# Patient Record
Sex: FEMALE | Race: WHITE | NOT HISPANIC OR LATINO | Employment: UNEMPLOYED | URBAN - METROPOLITAN AREA
[De-identification: names, ages, dates, MRNs, and addresses within clinical notes are randomized per-mention and may not be internally consistent; named-entity substitution may affect disease eponyms.]

---

## 2018-05-01 ENCOUNTER — HISTORICAL (OUTPATIENT)
Dept: ENDOSCOPY | Facility: HOSPITAL | Age: 48
End: 2018-05-01

## 2018-11-07 ENCOUNTER — HISTORICAL (OUTPATIENT)
Dept: ADMINISTRATIVE | Facility: HOSPITAL | Age: 48
End: 2018-11-07

## 2019-12-11 ENCOUNTER — HISTORICAL (OUTPATIENT)
Dept: ADMINISTRATIVE | Facility: HOSPITAL | Age: 49
End: 2019-12-11

## 2020-09-15 ENCOUNTER — HISTORICAL (OUTPATIENT)
Dept: ADMINISTRATIVE | Facility: HOSPITAL | Age: 50
End: 2020-09-15

## 2020-09-15 LAB
ABS NEUT (OLG): 4.77 X10(3)/MCL (ref 2.1–9.2)
ALBUMIN SERPL-MCNC: 3.6 GM/DL (ref 3.4–5)
ALBUMIN/GLOB SERPL: 0.9 RATIO (ref 1.1–2)
ALP SERPL-CCNC: 83 UNIT/L (ref 45–117)
ALT SERPL-CCNC: 43 UNIT/L (ref 12–78)
APPEARANCE, UA: ABNORMAL
AST SERPL-CCNC: 26 UNIT/L (ref 15–37)
BACTERIA #/AREA URNS AUTO: ABNORMAL /HPF
BASOPHILS # BLD AUTO: 0 X10(3)/MCL (ref 0–0.2)
BASOPHILS NFR BLD AUTO: 0 %
BILIRUB SERPL-MCNC: 0.8 MG/DL (ref 0.2–1)
BILIRUB UR QL STRIP: NEGATIVE
BILIRUBIN DIRECT+TOT PNL SERPL-MCNC: 0.2 MG/DL (ref 0–0.2)
BILIRUBIN DIRECT+TOT PNL SERPL-MCNC: 0.6 MG/DL
BUN SERPL-MCNC: 14 MG/DL (ref 7–18)
C3 SERPL-MCNC: 178 MG/DL (ref 80–173)
C4 SERPL-MCNC: 33.7 MG/DL (ref 13–46)
CALCIUM SERPL-MCNC: 9.2 MG/DL (ref 8.5–10.1)
CHLORIDE SERPL-SCNC: 105 MMOL/L (ref 98–107)
CO2 SERPL-SCNC: 29 MMOL/L (ref 21–32)
COLOR UR: YELLOW
CREAT SERPL-MCNC: 0.9 MG/DL (ref 0.6–1.3)
CREAT UR-MCNC: 163 MG/DL
CRP SERPL-MCNC: 1.5 MG/DL
EOSINOPHIL # BLD AUTO: 0.2 X10(3)/MCL (ref 0–0.9)
EOSINOPHIL NFR BLD AUTO: 2 %
ERYTHROCYTE [DISTWIDTH] IN BLOOD BY AUTOMATED COUNT: 13.6 % (ref 11.5–14.5)
ERYTHROCYTE [SEDIMENTATION RATE] IN BLOOD: 25 MM/HR (ref 0–20)
GLOBULIN SER-MCNC: 4.1 GM/ML (ref 2.3–3.5)
GLUCOSE (UA): 300 MG/DL
GLUCOSE SERPL-MCNC: 124 MG/DL (ref 74–106)
HCT VFR BLD AUTO: 37.5 % (ref 35–46)
HGB BLD-MCNC: 11.9 GM/DL (ref 12–16)
HGB UR QL STRIP: NEGATIVE
HYALINE CASTS #/AREA URNS LPF: ABNORMAL /LPF
IMM GRANULOCYTES # BLD AUTO: 0.02 10*3/UL
IMM GRANULOCYTES NFR BLD AUTO: 0 %
KETONES UR QL STRIP: NEGATIVE
LEUKOCYTE ESTERASE UR QL STRIP: NEGATIVE
LYMPHOCYTES # BLD AUTO: 2.5 X10(3)/MCL (ref 0.6–4.6)
LYMPHOCYTES NFR BLD AUTO: 30 %
MCH RBC QN AUTO: 27.8 PG (ref 26–34)
MCHC RBC AUTO-ENTMCNC: 31.7 GM/DL (ref 31–37)
MCV RBC AUTO: 87.6 FL (ref 80–100)
MONOCYTES # BLD AUTO: 0.6 X10(3)/MCL (ref 0.1–1.3)
MONOCYTES NFR BLD AUTO: 7 %
NEUTROPHILS # BLD AUTO: 4.77 X10(3)/MCL (ref 2.1–9.2)
NEUTROPHILS NFR BLD AUTO: 59 %
NITRITE UR QL STRIP: NEGATIVE
PH UR STRIP: 5.5 [PH] (ref 4.5–8)
PLATELET # BLD AUTO: 214 X10(3)/MCL (ref 130–400)
PMV BLD AUTO: 11 FL (ref 7.4–10.4)
POTASSIUM SERPL-SCNC: 4.2 MMOL/L (ref 3.5–5.1)
PROT SERPL-MCNC: 7.7 GM/DL (ref 6.4–8.2)
PROT UR QL STRIP: NEGATIVE
PROT UR STRIP-MCNC: 13.3 MG/DL
PROT/CREAT UR-RTO: 81.6 MG/GM
RBC # BLD AUTO: 4.28 X10(6)/MCL (ref 4–5.2)
RBC #/AREA URNS AUTO: ABNORMAL /HPF
SODIUM SERPL-SCNC: 139 MMOL/L (ref 136–145)
SP GR UR STRIP: 1.02 (ref 1–1.03)
SQUAMOUS #/AREA URNS LPF: >100 /LPF
URATE SERPL-MCNC: 5.3 MG/DL (ref 2.6–6)
UROBILINOGEN UR STRIP-ACNC: NORMAL
WBC # SPEC AUTO: 8.1 X10(3)/MCL (ref 4.5–11)
WBC #/AREA URNS AUTO: ABNORMAL /HPF

## 2020-09-17 LAB — FINAL CULTURE: NO GROWTH

## 2020-09-22 ENCOUNTER — HISTORICAL (OUTPATIENT)
Dept: ADMINISTRATIVE | Facility: HOSPITAL | Age: 50
End: 2020-09-22

## 2020-11-24 ENCOUNTER — HISTORICAL (OUTPATIENT)
Dept: ANESTHESIOLOGY | Facility: HOSPITAL | Age: 50
End: 2020-11-24

## 2020-11-24 LAB — CRC RECOMMENDATION EXT: NORMAL

## 2020-12-29 ENCOUNTER — HISTORICAL (OUTPATIENT)
Dept: ADMINISTRATIVE | Facility: HOSPITAL | Age: 50
End: 2020-12-29

## 2021-03-04 LAB
CHOLEST SERPL-MCNC: 166 MG/DL (ref 0–200)
DEPRECATED CALCIDIOL+CALCIFEROL SERPL-MC: 45 NG/ML (ref 30–100)
EST. AVERAGE GLUCOSE BLD GHB EST-MCNC: 144 MG/DL (ref 70–115)
HBA1C MFR BLD: 6.8 % (ref 4–6)
HDLC SERPL-MCNC: 49 MG/DL (ref 40–60)
LDLC SERPL CALC-MCNC: 108.1 MG/DL (ref 30–100)
TRIGL SERPL-MCNC: 107 MG/DL (ref 30–200)
TSH SERPL-ACNC: 2.52 UIU/ML (ref 0.36–3.74)

## 2021-05-27 LAB
ALBUMIN SERPL-MCNC: 3.6 G/DL (ref 3.4–5)
ALBUMIN/GLOB SERPL: 1.3 {RATIO}
ALP SERPL-CCNC: 82 U/L (ref 50–144)
ALT SERPL-CCNC: 24 U/L (ref 1–45)
ANION GAP SERPL CALC-SCNC: 7 MMOL/L (ref 7–16)
AST SERPL-CCNC: 24 U/L (ref 14–36)
BASOPHILS # BLD AUTO: 0.02 X10(3)/MCL (ref 0.01–0.08)
BASOPHILS NFR BLD AUTO: 0.3 % (ref 0.1–1.2)
BILIRUB SERPL-MCNC: 0.92 MG/DL (ref 0.1–1)
BUN SERPL-MCNC: 12 MG/DL (ref 7–20)
CALCIUM SERPL-MCNC: 9 MG/DL (ref 8.4–10.2)
CHLORIDE SERPL-SCNC: 102 MMOL/L (ref 94–110)
CHOLEST SERPL-MCNC: 140 MG/DL (ref 0–200)
CO2 SERPL-SCNC: 30 MMOL/L (ref 21–32)
CREAT SERPL-MCNC: 0.8 MG/DL (ref 0.52–1.04)
CREAT/UREA NIT SERPL: 15 (ref 12–20)
EOSINOPHIL # BLD AUTO: 0.21 X10(3)/MCL (ref 0.04–0.36)
EOSINOPHIL NFR BLD AUTO: 2.9 % (ref 0.7–7)
ERYTHROCYTE [DISTWIDTH] IN BLOOD BY AUTOMATED COUNT: 14.5 % (ref 11–14.5)
GLOBULIN SER-MCNC: 2.8 G/DL (ref 2–3.9)
GLUCOSE SERPL-MCNC: 157 MGM./DL (ref 70–115)
HCT VFR BLD AUTO: 37.6 % (ref 36–48)
HDLC SERPL-MCNC: 46 MG/DL (ref 40–60)
HGB BLD-MCNC: 11.7 G/DL (ref 11.8–16)
IMM GRANULOCYTES # BLD AUTO: 0.01 X10E3/UL (ref 0–0.03)
IMM GRANULOCYTES NFR BLD AUTO: 0.1 % (ref 0–0.5)
LDLC SERPL CALC-MCNC: 76.6 MG/DL (ref 30–100)
LYMPHOCYTES # BLD AUTO: 2.18 X10(3)/MCL (ref 1.16–3.74)
LYMPHOCYTES NFR BLD AUTO: 30 % (ref 20–55)
MCH RBC QN AUTO: 26.8 PG (ref 27–34)
MCHC RBC AUTO-ENTMCNC: 31.1 G/DL (ref 31–37)
MCV RBC AUTO: 86.2 FL (ref 79–99)
MONOCYTES # BLD AUTO: 0.46 X10(3)/MCL (ref 0.24–0.36)
MONOCYTES NFR BLD AUTO: 6.3 % (ref 4.7–12.5)
NEUTROPHILS # BLD AUTO: 4.38 X10(3)/MCL (ref 1.56–6.13)
NEUTROPHILS NFR BLD AUTO: 60.4 % (ref 37–73)
PLATELET # BLD AUTO: 212 X10(3)/MCL (ref 140–371)
PMV BLD AUTO: 12.1 FL (ref 9.4–12.4)
POTASSIUM SERPL-SCNC: 4.5 MMOL/L (ref 3.5–5.1)
PROT SERPL-MCNC: 6.4 G/DL (ref 6.3–8.2)
RBC # BLD AUTO: 4.36 X10(6)/MCL (ref 4–5.1)
SODIUM SERPL-SCNC: 139 MMOL/L (ref 135–145)
TRIGL SERPL-MCNC: 99 MG/DL (ref 30–200)
WBC # SPEC AUTO: 7.3 X10(3)/MCL (ref 4–11.5)

## 2021-06-03 LAB
EST. AVERAGE GLUCOSE BLD GHB EST-MCNC: 159 MG/DL (ref 70–115)
HBA1C MFR BLD: 7.3 % (ref 4–6)

## 2021-07-22 DIAGNOSIS — G56.03 BILATERAL CARPAL TUNNEL SYNDROME: Primary | ICD-10-CM

## 2021-07-30 DIAGNOSIS — G56.03 CARPAL TUNNEL SYNDROME, BILATERAL: Primary | ICD-10-CM

## 2021-08-19 ENCOUNTER — OFFICE VISIT (OUTPATIENT)
Dept: ORTHOPEDICS | Facility: CLINIC | Age: 51
End: 2021-08-19
Payer: MEDICAID

## 2021-08-19 DIAGNOSIS — G56.03 CARPAL TUNNEL SYNDROME, BILATERAL: Primary | ICD-10-CM

## 2021-08-19 DIAGNOSIS — M77.11 RIGHT LATERAL EPICONDYLITIS: ICD-10-CM

## 2021-08-19 PROCEDURE — 99203 OFFICE O/P NEW LOW 30 MIN: CPT | Mod: 25,S$GLB,, | Performed by: ORTHOPAEDIC SURGERY

## 2021-08-19 PROCEDURE — 20551 NJX 1 TENDON ORIGIN/INSJ: CPT | Mod: 51,RT,S$GLB, | Performed by: ORTHOPAEDIC SURGERY

## 2021-08-19 PROCEDURE — 20526 THER INJECTION CARP TUNNEL: CPT | Mod: 59,50,S$GLB, | Performed by: ORTHOPAEDIC SURGERY

## 2021-08-19 PROCEDURE — 99203 PR OFFICE/OUTPT VISIT, NEW, LEVL III, 30-44 MIN: ICD-10-PCS | Mod: 25,S$GLB,, | Performed by: ORTHOPAEDIC SURGERY

## 2021-08-19 PROCEDURE — 20526 CARPAL TUNNEL: ICD-10-PCS | Mod: 59,50,S$GLB, | Performed by: ORTHOPAEDIC SURGERY

## 2021-08-19 PROCEDURE — 20551 TENDON ORIGIN: R ELBOW: ICD-10-PCS | Mod: 51,RT,S$GLB, | Performed by: ORTHOPAEDIC SURGERY

## 2021-08-19 RX ORDER — LOSARTAN POTASSIUM 50 MG/1
TABLET ORAL
COMMUNITY
Start: 2021-08-02 | End: 2023-04-18 | Stop reason: DRUGHIGH

## 2021-08-19 RX ORDER — VERAPAMIL HYDROCHLORIDE 360 MG/1
360 CAPSULE, DELAYED RELEASE PELLETS ORAL DAILY
COMMUNITY
Start: 2021-08-02

## 2021-08-19 RX ORDER — PEN NEEDLE, DIABETIC 31 GX5/16"
NEEDLE, DISPOSABLE MISCELLANEOUS
COMMUNITY
Start: 2021-06-03

## 2021-08-19 RX ORDER — CHOLECALCIFEROL (VITAMIN D3) 125 MCG
5000 CAPSULE ORAL DAILY
COMMUNITY
Start: 2021-08-02 | End: 2021-12-16 | Stop reason: SDUPTHER

## 2021-08-19 RX ORDER — ALBUTEROL SULFATE 0.83 MG/ML
SOLUTION RESPIRATORY (INHALATION)
COMMUNITY
Start: 2021-07-19

## 2021-08-19 RX ORDER — DULOXETIN HYDROCHLORIDE 60 MG/1
60 CAPSULE, DELAYED RELEASE ORAL DAILY
COMMUNITY
Start: 2021-08-12 | End: 2023-03-01

## 2021-08-19 RX ORDER — ALBUTEROL SULFATE 90 UG/1
AEROSOL, METERED RESPIRATORY (INHALATION)
COMMUNITY
Start: 2021-07-19 | End: 2023-08-09 | Stop reason: SDUPTHER

## 2021-08-19 RX ORDER — ATORVASTATIN CALCIUM 80 MG/1
TABLET, FILM COATED ORAL
COMMUNITY
Start: 2021-08-12 | End: 2023-03-01

## 2021-08-19 RX ORDER — GABAPENTIN 400 MG/1
400 CAPSULE ORAL NIGHTLY
COMMUNITY
Start: 2021-08-12 | End: 2023-08-09 | Stop reason: SDUPTHER

## 2021-08-19 RX ORDER — DICYCLOMINE HYDROCHLORIDE 10 MG/1
10 CAPSULE ORAL
COMMUNITY
Start: 2020-08-21 | End: 2023-06-14

## 2021-08-19 RX ORDER — CETIRIZINE HYDROCHLORIDE 10 MG/1
TABLET, FILM COATED ORAL
COMMUNITY
Start: 2021-08-12 | End: 2021-12-16 | Stop reason: SDUPTHER

## 2021-08-19 RX ORDER — CALCIUM CITRATE/VITAMIN D3 200MG-6.25
TABLET ORAL
COMMUNITY
Start: 2021-08-12 | End: 2023-08-09 | Stop reason: SDUPTHER

## 2021-08-19 RX ORDER — TRIAMCINOLONE ACETONIDE 1 MG/G
1 OINTMENT TOPICAL DAILY PRN
COMMUNITY
Start: 2020-11-12

## 2021-08-19 RX ORDER — ACETAMINOPHEN 500 MG
5000 TABLET ORAL
COMMUNITY
Start: 2020-08-21 | End: 2021-12-16 | Stop reason: SDUPTHER

## 2021-08-19 RX ORDER — TIZANIDINE 4 MG/1
TABLET ORAL
COMMUNITY
Start: 2021-08-12 | End: 2023-03-23 | Stop reason: SDUPTHER

## 2021-08-19 RX ORDER — INSULIN PUMP SYRINGE, 3 ML
EACH MISCELLANEOUS
COMMUNITY
Start: 2021-01-05

## 2021-08-19 RX ORDER — RANOLAZINE 500 MG/1
500 TABLET, EXTENDED RELEASE ORAL 2 TIMES DAILY
COMMUNITY
Start: 2021-08-12

## 2021-08-19 RX ORDER — FOLIC ACID 1 MG/1
1000 TABLET ORAL DAILY
COMMUNITY
Start: 2021-08-12 | End: 2023-03-01

## 2021-08-19 RX ORDER — CETIRIZINE HYDROCHLORIDE 10 MG/1
10 TABLET ORAL
COMMUNITY
Start: 2020-08-21 | End: 2023-03-23 | Stop reason: SDUPTHER

## 2021-08-19 RX ORDER — DEXAMETHASONE 4 MG/1
1 TABLET ORAL EVERY 12 HOURS
COMMUNITY
Start: 2020-08-21 | End: 2023-08-09 | Stop reason: SDUPTHER

## 2021-08-19 RX ORDER — DOXYCYCLINE 100 MG/1
100 CAPSULE ORAL 2 TIMES DAILY
COMMUNITY
Start: 2021-04-26 | End: 2021-12-16 | Stop reason: SDUPTHER

## 2021-08-19 RX ORDER — METFORMIN HYDROCHLORIDE 500 MG/1
1000 TABLET ORAL 2 TIMES DAILY
COMMUNITY
Start: 2021-08-12 | End: 2023-05-16 | Stop reason: SDUPTHER

## 2021-08-19 RX ORDER — MONTELUKAST SODIUM 10 MG/1
TABLET ORAL
COMMUNITY
Start: 2021-08-02 | End: 2023-03-23 | Stop reason: SDUPTHER

## 2021-09-07 LAB
ALBUMIN SERPL-MCNC: 3.8 G/DL (ref 3.4–5)
ALBUMIN/GLOB SERPL: 1.3 {RATIO}
ALP SERPL-CCNC: 80 U/L (ref 50–144)
ALT SERPL-CCNC: 26 U/L (ref 1–45)
ANION GAP SERPL CALC-SCNC: 6 MMOL/L (ref 7–16)
AST SERPL-CCNC: 22 U/L (ref 14–36)
BILIRUB SERPL-MCNC: 0.89 MG/DL (ref 0.1–1)
BUN SERPL-MCNC: 18 MG/DL (ref 7–20)
CALCIUM SERPL-MCNC: 9.3 MG/DL (ref 8.4–10.2)
CHLORIDE SERPL-SCNC: 100 MMOL/L (ref 94–110)
CO2 SERPL-SCNC: 33 MMOL/L (ref 21–32)
CREAT SERPL-MCNC: 0.8 MG/DL (ref 0.52–1.04)
CREAT/UREA NIT SERPL: 22.5 (ref 12–20)
EST. AVERAGE GLUCOSE BLD GHB EST-MCNC: 147 MG/DL (ref 70–115)
GLOBULIN SER-MCNC: 2.9 G/DL (ref 2–3.9)
GLUCOSE SERPL-MCNC: 113 MGM./DL (ref 70–115)
HBA1C MFR BLD: 6.9 % (ref 4–6)
POTASSIUM SERPL-SCNC: 4.5 MMOL/L (ref 3.5–5.1)
PROT SERPL-MCNC: 6.7 G/DL (ref 6.3–8.2)
SODIUM SERPL-SCNC: 139 MMOL/L (ref 135–145)

## 2021-10-18 ENCOUNTER — HISTORICAL (OUTPATIENT)
Dept: ADMINISTRATIVE | Facility: HOSPITAL | Age: 51
End: 2021-10-18

## 2021-10-21 LAB
LEFT EYE DM RETINOPATHY: NEGATIVE
RIGHT EYE DM RETINOPATHY: NEGATIVE

## 2021-11-19 ENCOUNTER — HISTORICAL (OUTPATIENT)
Dept: ADMINISTRATIVE | Facility: HOSPITAL | Age: 51
End: 2021-11-19

## 2021-12-16 ENCOUNTER — OFFICE VISIT (OUTPATIENT)
Dept: ORTHOPEDICS | Facility: CLINIC | Age: 51
End: 2021-12-16
Payer: MEDICAID

## 2021-12-16 DIAGNOSIS — G56.03 CARPAL TUNNEL SYNDROME, BILATERAL: Primary | ICD-10-CM

## 2021-12-16 LAB
ANION GAP SERPL CALC-SCNC: 7 MMOL/L (ref 3–11)
BASOPHILS NFR BLD: 0.3 % (ref 0–3)
BUN SERPL-MCNC: 13 MG/DL (ref 7–18)
BUN/CREAT SERPL: 15.66 RATIO (ref 7–18)
CALCIUM SERPL-MCNC: 8.8 MG/DL (ref 8.8–10.5)
CHLORIDE SERPL-SCNC: 104 MMOL/L (ref 100–108)
CO2 SERPL-SCNC: 29 MMOL/L (ref 21–32)
CREAT SERPL-MCNC: 0.83 MG/DL (ref 0.55–1.02)
EOSINOPHIL NFR BLD: 2.2 % (ref 1–3)
ERYTHROCYTE [DISTWIDTH] IN BLOOD BY AUTOMATED COUNT: 14 % (ref 12.5–18)
GFR ESTIMATION: > 60
GLUCOSE SERPL-MCNC: 156 MG/DL (ref 70–110)
HCT VFR BLD AUTO: 37 % (ref 37–47)
HGB BLD-MCNC: 11.5 G/DL (ref 12–16)
LYMPHOCYTES NFR BLD: 30.5 % (ref 25–40)
MCH RBC QN AUTO: 27.5 PG (ref 27–31.2)
MCHC RBC AUTO-ENTMCNC: 31.1 G/DL (ref 31.8–35.4)
MCV RBC AUTO: 88.5 FL (ref 80–97)
MONOCYTES NFR BLD: 7.2 % (ref 1–15)
NEUTROPHILS # BLD AUTO: 4.28 10*3/UL (ref 1.8–7.7)
NEUTROPHILS NFR BLD: 59.5 % (ref 37–80)
NUCLEATED RED BLOOD CELLS: 0 %
PLATELETS: 213 10*3/UL (ref 142–424)
POTASSIUM SERPL-SCNC: 4.2 MMOL/L (ref 3.6–5.2)
RBC # BLD AUTO: 4.18 10*6/UL (ref 4.2–5.4)
SODIUM BLD-SCNC: 140 MMOL/L (ref 135–145)
WBC # BLD: 7.2 10*3/UL (ref 4.6–10.2)

## 2021-12-16 PROCEDURE — 4010F PR ACE/ARB THEARPY RXD/TAKEN: ICD-10-PCS | Mod: CPTII,S$GLB,, | Performed by: ORTHOPAEDIC SURGERY

## 2021-12-16 PROCEDURE — 4010F ACE/ARB THERAPY RXD/TAKEN: CPT | Mod: CPTII,S$GLB,, | Performed by: ORTHOPAEDIC SURGERY

## 2021-12-16 PROCEDURE — 99213 PR OFFICE/OUTPT VISIT, EST, LEVL III, 20-29 MIN: ICD-10-PCS | Mod: S$GLB,,, | Performed by: ORTHOPAEDIC SURGERY

## 2021-12-16 PROCEDURE — 99213 OFFICE O/P EST LOW 20 MIN: CPT | Mod: S$GLB,,, | Performed by: ORTHOPAEDIC SURGERY

## 2021-12-16 RX ORDER — ACETAMINOPHEN 500 MG
5000 TABLET ORAL
COMMUNITY
Start: 2021-10-06 | End: 2023-03-01

## 2021-12-16 RX ORDER — METOPROLOL SUCCINATE 25 MG/1
TABLET, EXTENDED RELEASE ORAL
COMMUNITY
Start: 2021-12-14 | End: 2023-03-01

## 2021-12-16 RX ORDER — NITROGLYCERIN 0.4 MG/1
TABLET SUBLINGUAL
COMMUNITY
Start: 2021-10-18

## 2021-12-16 RX ORDER — ASPIRIN 81 MG/1
81 TABLET ORAL DAILY
COMMUNITY
End: 2023-08-09 | Stop reason: SDUPTHER

## 2021-12-22 ENCOUNTER — OUTSIDE PLACE OF SERVICE (OUTPATIENT)
Dept: ADMINISTRATIVE | Facility: OTHER | Age: 51
End: 2021-12-22
Payer: MEDICAID

## 2021-12-22 LAB — GLUCOSE SERPL-MCNC: 125 MG/DL (ref 70–105)

## 2021-12-22 PROCEDURE — 64721 CARPAL TUNNEL SURGERY: CPT | Mod: RT,,, | Performed by: ORTHOPAEDIC SURGERY

## 2021-12-22 PROCEDURE — 64721 PR REVISE MEDIAN N/CARPAL TUNNEL SURG: ICD-10-PCS | Mod: RT,,, | Performed by: ORTHOPAEDIC SURGERY

## 2021-12-23 ENCOUNTER — TELEPHONE (OUTPATIENT)
Dept: ORTHOPEDICS | Facility: CLINIC | Age: 51
End: 2021-12-23
Payer: MEDICAID

## 2022-01-03 ENCOUNTER — OFFICE VISIT (OUTPATIENT)
Dept: ORTHOPEDICS | Facility: CLINIC | Age: 52
End: 2022-01-03
Payer: MEDICAID

## 2022-01-03 DIAGNOSIS — G56.03 CARPAL TUNNEL SYNDROME, BILATERAL: Primary | ICD-10-CM

## 2022-01-03 PROCEDURE — 1159F MED LIST DOCD IN RCRD: CPT | Mod: CPTII,S$GLB,, | Performed by: ORTHOPAEDIC SURGERY

## 2022-01-03 PROCEDURE — 99024 PR POST-OP FOLLOW-UP VISIT: ICD-10-PCS | Mod: S$GLB,,, | Performed by: ORTHOPAEDIC SURGERY

## 2022-01-03 PROCEDURE — 1159F PR MEDICATION LIST DOCUMENTED IN MEDICAL RECORD: ICD-10-PCS | Mod: CPTII,S$GLB,, | Performed by: ORTHOPAEDIC SURGERY

## 2022-01-03 PROCEDURE — 1160F PR REVIEW ALL MEDS BY PRESCRIBER/CLIN PHARMACIST DOCUMENTED: ICD-10-PCS | Mod: CPTII,S$GLB,, | Performed by: ORTHOPAEDIC SURGERY

## 2022-01-03 PROCEDURE — 1160F RVW MEDS BY RX/DR IN RCRD: CPT | Mod: CPTII,S$GLB,, | Performed by: ORTHOPAEDIC SURGERY

## 2022-01-03 PROCEDURE — 99024 POSTOP FOLLOW-UP VISIT: CPT | Mod: S$GLB,,, | Performed by: ORTHOPAEDIC SURGERY

## 2022-01-03 RX ORDER — DOXYCYCLINE 100 MG/1
CAPSULE ORAL
COMMUNITY
Start: 2021-12-27 | End: 2023-04-05

## 2022-01-03 RX ORDER — HYDROCODONE BITARTRATE AND ACETAMINOPHEN 5; 325 MG/1; MG/1
1 TABLET ORAL 3 TIMES DAILY PRN
COMMUNITY
Start: 2021-12-22 | End: 2023-06-14

## 2022-01-03 RX ORDER — PREDNISONE 20 MG/1
TABLET ORAL
COMMUNITY
Start: 2021-12-27 | End: 2023-04-05

## 2022-01-03 NOTE — PROGRESS NOTES
Subjective:      Patient ID: Maggi Hatfield is a 51 y.o. female.    Chief Complaint: Post-op Evaluation of the Right Wrist    HPI 51 year lady postop right carpal tunnel release.  She has minimal complaints.    ROS unchanged from prior visit      Objective:      Incision is clean.  There is no drainage or swelling.      Ortho/SPM Exam            Assessment:       Encounter Diagnosis   Name Primary?    Carpal tunnel syndrome, bilateral Yes          Plan:       Maggi was seen today for post-op evaluation.    Diagnoses and all orders for this visit:    Carpal tunnel syndrome, bilateral    Sutures are removed today.  She is instructed in massaging her scar.  She will contact us when she is ready to do her left side

## 2022-01-27 ENCOUNTER — HISTORICAL (OUTPATIENT)
Dept: ADMINISTRATIVE | Facility: HOSPITAL | Age: 52
End: 2022-01-27

## 2022-02-08 ENCOUNTER — TELEPHONE (OUTPATIENT)
Dept: ORTHOPEDICS | Facility: CLINIC | Age: 52
End: 2022-02-08
Payer: MEDICAID

## 2022-02-08 NOTE — TELEPHONE ENCOUNTER
----- Message from Radha Black sent at 2/8/2022  1:57 PM CST -----  Regarding: Problems/Concern  Contact: patient  Per phone call with patient, she stated that she had surgery on December 22/2021 on the right hand and she is having trouble with her pinky and it is in pain and down her hand.  Please return call at [phone] (home).    Thanks,  SJ

## 2022-02-10 ENCOUNTER — OFFICE VISIT (OUTPATIENT)
Dept: ORTHOPEDICS | Facility: CLINIC | Age: 52
End: 2022-02-10
Payer: MEDICAID

## 2022-02-10 DIAGNOSIS — M77.8 TENDINITIS OF FINGER OF RIGHT HAND: Primary | ICD-10-CM

## 2022-02-10 PROCEDURE — 99024 PR POST-OP FOLLOW-UP VISIT: ICD-10-PCS | Mod: ,,, | Performed by: ORTHOPAEDIC SURGERY

## 2022-02-10 PROCEDURE — 99212 PR OFFICE/OUTPT VISIT, EST, LEVL II, 10-19 MIN: ICD-10-PCS | Mod: 24,S$GLB,, | Performed by: ORTHOPAEDIC SURGERY

## 2022-02-10 PROCEDURE — 1159F PR MEDICATION LIST DOCUMENTED IN MEDICAL RECORD: ICD-10-PCS | Mod: CPTII,S$GLB,, | Performed by: ORTHOPAEDIC SURGERY

## 2022-02-10 PROCEDURE — 1159F MED LIST DOCD IN RCRD: CPT | Mod: CPTII,S$GLB,, | Performed by: ORTHOPAEDIC SURGERY

## 2022-02-10 PROCEDURE — 99024 POSTOP FOLLOW-UP VISIT: CPT | Mod: ,,, | Performed by: ORTHOPAEDIC SURGERY

## 2022-02-10 PROCEDURE — 1160F PR REVIEW ALL MEDS BY PRESCRIBER/CLIN PHARMACIST DOCUMENTED: ICD-10-PCS | Mod: CPTII,S$GLB,, | Performed by: ORTHOPAEDIC SURGERY

## 2022-02-10 PROCEDURE — 1160F RVW MEDS BY RX/DR IN RCRD: CPT | Mod: CPTII,S$GLB,, | Performed by: ORTHOPAEDIC SURGERY

## 2022-02-10 PROCEDURE — 99212 OFFICE O/P EST SF 10 MIN: CPT | Mod: 24,S$GLB,, | Performed by: ORTHOPAEDIC SURGERY

## 2022-02-10 RX ORDER — CHOLECALCIFEROL (VITAMIN D3) 125 MCG
5000 CAPSULE ORAL DAILY
COMMUNITY
Start: 2022-01-13 | End: 2023-08-09 | Stop reason: SDUPTHER

## 2022-02-10 RX ORDER — METHYLPREDNISOLONE 4 MG/1
TABLET ORAL
Qty: 21 EACH | Refills: 0 | Status: SHIPPED | OUTPATIENT
Start: 2022-02-10 | End: 2022-03-03

## 2022-02-10 RX ORDER — TIZANIDINE 4 MG/1
4 TABLET ORAL
COMMUNITY
Start: 2021-08-12 | End: 2023-03-01

## 2022-02-10 NOTE — PROGRESS NOTES
Subjective:      Patient ID: Maggi Hatfield is a 51 y.o. female.    Chief Complaint: Pain and Post-op Evaluation of the Right Hand    HPI 51-year-old lady comes in with pain in the right 5th finger.  She denies any history of trauma.  She is status post right carpal tunnel release and has done well from that.  She denies any catching or popping in the finger.    ROS unchanged from prior visit      Objective:      Active and passive range of motion of the right 5th finger is normal.  She has a tender nodule in the region of the A1 pulley but no triggering.  She has normal sensation and capillary refill.      Ortho/SPM Exam            Assessment:       Encounter Diagnosis   Name Primary?    Tendinitis of finger of right hand Yes          Plan:       Maggi was seen today for pain and post-op evaluation.    Diagnoses and all orders for this visit:    Tendinitis of finger of right hand    She is placed on a Medrol Dosepak.  Return p.r.n.

## 2022-02-18 LAB
AGE: 51
ALBUMIN SERPL-MCNC: 3.8 G/DL (ref 3.4–5)
ALBUMIN/GLOB SERPL: 1.6 {RATIO}
ALP SERPL-CCNC: 69 U/L (ref 50–144)
ALT SERPL-CCNC: 32 U/L (ref 1–45)
ANION GAP SERPL CALC-SCNC: 6 MMOL/L (ref 2–13)
AST SERPL-CCNC: 34 U/L (ref 14–36)
BILIRUB SERPL-MCNC: 1.01 MG/DL (ref 0–1)
BUN SERPL-MCNC: 17 MG/DL (ref 7–20)
CALCIUM SERPL-MCNC: 9 MG/DL (ref 8.4–10.2)
CHLORIDE SERPL-SCNC: 103 MMOL/L (ref 94–110)
CO2 SERPL-SCNC: 30 MMOL/L (ref 21–32)
CREAT SERPL-MCNC: 0.79 MG/DL (ref 0.52–1.04)
CREAT/UREA NIT SERPL: 21.5 (ref 12–20)
GLOBULIN SER-MCNC: 2.4 G/DL (ref 2–3.9)
GLUCOSE SERPL-MCNC: 141 MG/DL (ref 70–115)
POTASSIUM SERPL-SCNC: 4.4 MMOL/L (ref 3.5–5.1)
PROT SERPL-MCNC: 6.2 G/DL (ref 6.3–8.2)
SODIUM SERPL-SCNC: 139 MMOL/L (ref 135–145)

## 2022-03-29 LAB
CHOLEST SERPL-MCNC: 116 MG/DL (ref 0–200)
HDLC SERPL-MCNC: 36 MG/DL (ref 40–60)
LDLC SERPL CALC-MCNC: 64.7 MG/DL (ref 30–100)
TRIGL SERPL-MCNC: 83 MG/DL (ref 30–200)

## 2022-04-10 ENCOUNTER — HISTORICAL (OUTPATIENT)
Dept: ADMINISTRATIVE | Facility: HOSPITAL | Age: 52
End: 2022-04-10
Payer: MEDICAID

## 2022-04-26 VITALS
DIASTOLIC BLOOD PRESSURE: 76 MMHG | HEIGHT: 65 IN | SYSTOLIC BLOOD PRESSURE: 122 MMHG | OXYGEN SATURATION: 97 % | WEIGHT: 293 LBS | BODY MASS INDEX: 48.82 KG/M2

## 2022-04-30 ENCOUNTER — HISTORICAL (OUTPATIENT)
Dept: ADMINISTRATIVE | Facility: HOSPITAL | Age: 52
End: 2022-04-30

## 2022-04-30 NOTE — OP NOTE
Patient:   Maggi Hatfield             MRN: [id number]            FIN: [id number]               Age:   47 years     Sex:  Female     :  1970   Associated Diagnoses:   None   Author:   Toro Dewey MD      Operative Note   Operative Information   Date/ Time:  2018 09:43:00.     Procedures Performed: EGD with biopsy.     Preoperative Diagnosis: Epigastric pain, nausea and vomiting..     Postoperative Diagnosis: 1.  Gastric polyp, 3-4 mm.  Biopsied.  2.  Antral fold.  Biopsies were done.  3.  Pyloric channel nodule, biopsied  .     Surgeon: Toro Dewey MD.     Assistant: GI STAFF.     Anesthesia: per anaesthesia service.     Esimated blood loss: loss  3  cc.     Description of Procedure/Findings/    Complications: History and physical as per pre-operative note. Informed consent was obtained. Patient was placed in left lateral position. Sedation per anaesthesia service. Olympus video gastroscope was introduced into the oral cavity and esophagus was intubated under direct visuaization. The scope was carefully advanced to the distal duodenum. The patient tolerated the procedure well without any complications. .     Findings: Esophagus: GE junction at approximately 40 cm.  Normal mucosa.  Stomach: There was a 2-3 mm polyp noted in the gastric body.  This was biopsied.  There was a prominent antral fold with surface erythema noted in the prepyloric region/antrum.  Biopsies were done.  No evidence of ulceration.  There was a nodularity noted in the pyloric channel which was approximately 3-4 mm.  This was biopsied.  Duodenum: Duodenal bulb, 2nd and 3rd portion of the duodenum appeared unremarkable to the extent of exam..     Complications: None.     Recommendations:  1.  Follow biopsy results.  2.  H2 blockers or PPI as needed.  3.  Patient to follow with me as scheduled..

## 2022-04-30 NOTE — OP NOTE
Patient:   Maggi Hatfield             MRN: [id number]            FIN: [id number]               Age:   50 years     Sex:  Female     :  1970   Associated Diagnoses:   Bright red rectal bleeding; External hemorrhoid, bleeding   Author:   Cortney Marino MD      Operative Note   Operative Information   Date/ Time:  2020 10:15:00.     Procedures Performed: Procedure Code   Colonoscopy, flexible; diagnostic, including collection of specimen(s) by brushing or washing, when performed (separate procedure) (75752)..     Indications: 50-year-old female previously undergoing colonoscopy with polypectomy approximately 5 days prior.  Stating that since the time of colonoscopy she has had persistent passage of blood with bowel movements.  Because of polypectomy and blood noted repeat colonoscopy will be performed for evaluation of etiologic causes.     Preoperative Diagnosis: Bright red rectal bleeding ([id number] K62.5).     Postoperative Diagnosis: External hemorrhoid, bleeding (DTS10-IO K64.4).     Surgeon: Cortney Marino MD.     Anesthesia: Intravenous MAC.     Speciman Removed: None.     Description of Procedure/Findings/    Complications: Patient was brought to the endoscopy suite laid in the left lateral decubitus position right side up.  Intravenous anesthesia was provided.  Digital rectal exam performed exhibiting good anal rectal tone and a thrombosed and partially bleeding external hemorrhoid.  An endoscope was then passed through the anus intubating the rectum and with gentle deflation reaching the cecum.  There was no noted blood during the advancement of the scope.  The scope was retrieved carefully without identified possible sources of bleeding noted within the colon.  Scope was retroflexed in the distal rectum revealing normal-appearing internal anal mucosa.  Scope returned to neutral position the colon evacuated of air.  The anal canal was then examined carefully.  There is a single thrombosed  and partially bleeding external hemorrhoid on the left lateral aspect.  With light pressure the thrombosed region was decompressed and the clot was removed.  It was monitored for a period of minutes with no pulsatile blood noted.  This is likely related to venous oozing.  A segment of 4 x 4 gauze was laid over the region.  Patient was then relieved of anesthesia stable condition transfer the postanesthesia care unit..     Esimated blood loss: loss  3  cc.     Findings: Partially thrombosed and bleeding external hemorrhoid on the left posterior anal canal.     Complications: None.

## 2022-05-01 ENCOUNTER — HISTORICAL (OUTPATIENT)
Dept: ADMINISTRATIVE | Facility: HOSPITAL | Age: 52
End: 2022-05-01
Payer: MEDICAID

## 2022-05-03 NOTE — HISTORICAL [facility] CERNER
This is a historical note converted from Dino. Formatting and pictures may have been removed.  Please reference Dino for original formatting and attached multimedia. Chief Complaint  New Patient  History of Present Illness  ???? Ms. Tay is a 51 y/o WF who presents for Rheumatology consultation with a positive VIVIAN 1:80 speckled, negative JENNIFER but unsure which lab drawn through.?Work up began?in the setting of joint pain. States she started having joint pain ~20 years ago. Has had multiple tests that show?positive and negative RF and states this is the first positive VIVIAN. Previously, seen be a Rheumatologist who prescribed narcotics. States she was dx w/ chronic pain. Currently, she states the hands, feet and hips are the worst. Feels the morning are the worst w/ stiffness for 2.5 hours. States after working out that initial stiffness then working with the hands all day and being on her feet, the pain is constant. Has tried OTC ibuprofen, diclofenac--has issues with GI tract and NSAID.?Has done prednisone tapers and finds it takes the edge off. States the joints will turn red and swell, no warmth.  ?  ROS: Denies constitutional s/s, eye inflammation, sicca, oral / nasal ulcers, cough, shortness of breath, LAD, serositis, blood clots, miscarriage, hair loss, sun sensitivity, Rayanuds, anemia, organ issues.  +Low-grade temp few nights/wk 100.4F, fatigue, weakness, seizure d/o?in childhood 2y/o, rash on back, joint pain, am stiffness.  ?  PMH: DM, CAD, CVA (15 y/o at school), HTN, GERD, HLD, DDD Lumbar, OA b/l shoulders  Social Hx: Denies TBO, drug use or ETOH  FH AI dz: mom w/ RA  Review of Systems  General: Denies chills and fever  Ophthalmologic: Denies discharge. Denies flashes of light in visual field  ENT: Denies decreased sense of smell. Denies nosebleeds.?  Endocrine: Denies excessive sweating. Denies weight loss  Respiratory: Denies hemoptysis. Denies tuberculosis.  Cardiovascular: Denies cyanosis. Denies  rheumatic fever.  Gastrointestinal: Denies difficulty swallowing. Denies hematemesis.  Hematology: Denies bleeding problems. Denies recent transfusion.  Peripheral vascular: Denies absent pulses in feet. Denies ulceration of the feet  Skin: Denies blistering of skin. Denies skin cancer.  Neurologic denies seizures. Denies stroke.  Psychiatric: Denies eating disorder. Denies loss of appetite.?  Physical Exam  Vitals & Measurements  T:?37.1? ?C (Oral)? HR:?64(Peripheral)? RR:?20? BP:?131/77?  HT:?166.00?cm? WT:?134.000?kg? BMI:?48.63?  General examination: Alert, pleasant, in no acute distress.  Head: Normocephalic, atraumatic.  Eyes: Conjunctiva clear, upper eyelids normal, lower eyelids normal.  Oral cavity: Mucosa moist, no lesions, fair dentition.  Throat: No exudate, no erythema.  Neck/thyroid: Neck supple, no lymphadenopathy, no thyroid nodules  Skin: No suspicious lesions, warm and dry. Thumb nail changes. Faded rash on back.  Heart: S1, S2 normal, regular rate and rhythm. No JVD.  Lungs: Clear to auscultation, no accessory muscle use.  Musculoskeletal: FROM.? No deformities.  Extremities: No clubbing or cyanosis.  Neurologic: Alert and oriented, cranial nerves II through XII grossly intact. Muscle strength grossly intact. Sensation grossly intact.  Psych: Cognitive function intact, mood/affect full range.?  Assessment/Plan  ?1. VIVIAN positive.?1:80 speckled, negative JENNIFER but unsure which lab drawn through.?Work up began?in the setting of joint pain. States she started having joint pain ~20 years ago. Has had multiple tests that show?positive and negative RF and states this is the first positive VIVIAN.  2. Joint pain. Gives inflammatory pattern of joint pain. Exam is negative for synovitis. States her mother has RA. Will check RF and CCP w/ [facility] as well as inflammatory markers and uric acid level.  3. Rash and nail changes. Rash on back has been?lingering-improves and then?worsens.?One small scaly area visible and  has some thickened?nail changes to?b/l thumb nails. Would appreciated an evaluation by Dermatology.?Refer to Derm in [place], Dr. Chery and TAMI Vaughan.  4. HX of CVA at age 16. Denies any other issues w/ clotting, no miscarriage. Will?check APLA.  5. DM. CCC.  6. HTN. CCC  7. HLD. CCC  8. GERD. avoid NSAIDS. CCC  ?  ?RTC 2 months.  > Today I spent?55 minutes in face-to-face time with the patient, 50% in direct counseling and coordination of care. We discussed the potential diagnoses, therapeutic options, treatment plan and what to expect in the future from this condition.  Referrals  Ambulatory Referral, Specialty: Dermatology, Reason: Rash on back, nail bed changes to b/l thumbs., Start: 09/15/20 13:57:00 CDT, Instructions: Refer to Derm in [place], Rash of back  Clinic Follow up, *Est. 11/17/20 14:00:00 CST, Order for future visit, VIVIAN positive, [facility]   Problem List/Past Medical History  Ongoing  VIVIAN positive  Chronic pain  CVA - Cerebrovascular accident  Degeneration of intervertebral disc  Disorder of optic nerve  DM - Diabetes mellitus  Environmental allergy  Erosive osteoarthrosis  GERD - Gastro-esophageal reflux disease  Heart murmur  HTN - Hypertension  Hyperlipidemia  Joint pain  Neuropathy  Pain in joint involving multiple sites  Restless legs  Sleep apnea  Tachycardia  Vitamin deficiency  Historical  CAD - Coronary artery disease  Diabetes mellitus  GERD - Gastro-esophageal reflux disease  Hypertension  Procedure/Surgical History  Biopsy Gastrointestinal (05/01/2018)  Esophagogastroduodenoscopy (05/01/2018)  Esophagogastroduodenoscopy, flexible, transoral; with biopsy, single or multiple (05/01/2018)  Excision of Stomach, Pylorus, Via Natural or Artificial Opening Endoscopic, Diagnostic (05/01/2018)  Cholecystectomy  Cholecystectomy  Esophagogastroduodenoscopy  Hysterectomy  Hysterectomy  Lipoma of back   Medications  albuterol CFC free 90 mcg/inh inhalation aerosol with adapter, 2  puff(s), INH, q6hr  atorvastatin 40 mg oral tablet, 40 mg= 1 tab(s), Oral, Daily, 11 refills  dicyclomine 10 mg oral capsule, 10 mg= 1 cap(s), Oral, TID, PRN, 11 refills  DULoxetine 60 mg oral delayed release capsule, 60 mg= 1 cap(s), Oral, Daily, 11 refills  Fish Oil 1200 mg oral capsule, 1200 mg= 1 cap(s), Oral, BID  Flonase 50 mcg/inh nasal spray, 1 spray(s), Nasal, BID, 11 refills  Flovent  mcg/inh inhalation aerosol, 220 mcg, INH, BID, 11 refills  folic acid 1 mg oral tablet, 1 mg= 1 tab(s), Oral, Daily, 11 refills  gabapentin 400 mg oral capsule, 400 mg= 1 cap(s), Oral, At Bedtime, 11 refills  Glucose Test Strips, See Instructions, 11 refills  ipratropium 42 mcg/inh (0.06%) nasal spray, 2 spray(s), Nasal, QID  losartan 100 mg oral tablet, 100 mg= 1 tab(s), Oral, Daily, 11 refills  metformin 500 mg oral tablet, 500 mg= 1 tab(s), Oral, BID, 11 refills  montelukast 10 mg oral TABLET, 10 mg= 1 tab(s), Oral, Daily, 1 refills  nitrofurantoin macrocrystals-monohydrate 100 mg oral capsule, 100 mg= 1 cap(s), Oral, BID,? ?Not Taking, Completed Rx  Pantoprazole 40 mg ORAL EC-Tablet, 40 mg= 1 tab(s), Oral, Daily  Ranexa 500 mg oral tablet, extended release, 500 mg= 1 tab(s), Oral, BID  tiZANidine 4 mg oral tablet, 4 mg= 1 tab(s), Oral, TID  verapamil 240 mg/24 hours oral capsule, extended release, 240 mg= 1 cap(s), Oral, Daily  Vitamin D3 5000 intl units (125 mcg) oral capsule, 5000 IntUnit= 1 cap(s), Oral, Daily, 11 refills  Zyrtec 10 mg oral tablet, 10 mg= 1 tab(s), Oral, Daily, 11 refills  Allergies  Keflex?(SEVERE ABDOMINAL PAIN)  Valium?(TACHYCARDIA)  penicillin?(RASH)  Social History  Abuse/Neglect  No, 08/21/2020  Alcohol  Never, 05/01/2018  Employment/School  Employed, 05/01/2018  Exercise  Exercise type: Walking., 05/01/2018  Home/Environment  Lives with Children., 05/01/2018  Nutrition/Health  Regular, 05/01/2018  Sexual  Sexually active: No., 05/01/2018  Sexually active: Yes., 05/01/2018  Substance  Use  Never, 05/01/2018  Tobacco  Never (less than 100 in lifetime), N/A, 09/15/2020  Never (less than 100 in lifetime), N/A, 08/21/2020  Family History  COPD (chronic obstructive pulmonary disease).: Father.  MI - myocardial infarction: Father.  Osteoarthritis: Father.  Primary malignant neoplasm of colon: Mother.  Rheumatoid arthritis: Mother.  Immunizations  Vaccine Date Status   influenza virus vaccine, inactivated 12/11/2019 Recorded   influenza virus vaccine, inactivated 11/05/2018 Recorded   Health Maintenance  Health Maintenance  ???Pending?(in the next year)  ??? ??OverDue  ??? ? ? ?Coronary Artery Disease Maintenance-Antiplatelet Agent Prescribed due??and every?  ??? ? ? ?Diabetes Maintenance-Microalbumin due??and every?  ??? ? ? ?Influenza Vaccine due??and every?  ??? ? ? ?Alcohol Misuse Screening due??01/02/20??and every 1??year(s)  ??? ??Due?  ??? ? ? ?Breast Cancer Screening due??09/15/20??and every?  ??? ? ? ?COPD Maintenance-Spirometry due??09/15/20??and every?  ??? ? ? ?Cervical Cancer Screening due??09/15/20??and every?  ??? ? ? ?Colorectal Screening due??09/15/20??and every?  ??? ? ? ?Depression Screening due??09/15/20??and every?  ??? ? ? ?Diabetes Maintenance-Eye Exam due??09/15/20??and every?  ??? ? ? ?Diabetes Maintenance-Fasting Lipid Profile due??09/15/20??Variable frequency  ??? ? ? ?Diabetes Maintenance-Foot Exam due??09/15/20??and every?  ??? ? ? ?Hypertension Management-Education due??09/15/20??and every 1??year(s)  ??? ? ? ?Tetanus Vaccine due??09/15/20??and every 10??year(s)  ??? ? ? ?Zoster Vaccine due??09/15/20??and every?  ??? ??Due In Future?  ??? ? ? ?Diabetes Maintenance-HgbA1c not due until??12/10/20??and every 1??year(s)  ??? ? ? ?Obesity Screening not due until??01/01/21??and every 1??year(s)  ???Satisfied?(in the past 1 year)  ??? ??Satisfied?  ??? ? ? ?ADL Screening on??09/15/20.??Satisfied by Destiny Hernanedz LPN  ??? ? ? ?Blood Pressure Screening on??09/15/20.??Satisfied by  Destiny Hernandez LPN  ??? ? ? ?Body Mass Index Check on??09/15/20.??Satisfied by Destiny Hernandez LPN  ??? ? ? ?Breast Cancer Screening on??08/21/20.??Satisfied by Duarte VALLE, Nelly Colunga  ??? ? ? ?Coronary Artery Disease Maintenance-Lipid Lowering Therapy on??08/21/20.??Satisfied by Jose E BORGESP-C, Beronica Calderon  ??? ? ? ?Diabetes Maintenance-Serum Creatinine on??09/15/20.??Satisfied by Dwight Wylie  ??? ? ? ?Diabetes Screening on??09/15/20.??Satisfied by Dwight Wylie  ??? ? ? ?Hypertension Management-BMP on??09/15/20.??Satisfied by Fransico Tsai  ??? ? ? ?Influenza Vaccine on??12/11/19.??Satisfied by Leanna Valdez LPN  ??? ? ? ?Obesity Screening on??09/15/20.??Satisfied by Destiny Hernandez LPN  ?

## 2022-05-03 NOTE — HISTORICAL [facility] CERNER
This is a historical note converted from [employer]. Formatting and pictures may have been removed.  Please reference [employer] for original formatting and attached multimedia. Chief Complaint  established.  History of Present Illness  Ms. Tay is a 51 y/o WF who presents for Rheumatology follow up w/ inflammatory arthritis. She was referred w/?borderline positive VIVIAN 1:80 speckled, however [facility] VIVIAN returned negative. Also w/ hx of CVA at a young age, APLA was checked and returned negative. Work up began?in the setting of joint pain. States she started having joint pain ~20 years ago. Has had multiple tests that show?positive and negative RF and states this is the first positive VIVIAN. Previously, seen be a Rheumatologist who prescribed narcotics. States she was dx w/ chronic pain. She voices an hour or so of morning stiffness with worst joints being?the hands, feet and hips are the worst. Gives hx of flares and gelling phenomenon. States after working out that initial stiffness then working with the hands all day and being on her feet, the pain is constant. Has tried OTC ibuprofen, diclofenac--has issues with GI tract and NSAID use.?Has done prednisone tapers and finds it takes the edge off. Here today to discuss lab and xray results.  ?   ROS: Denies constitutional s/s, eye inflammation, sicca, oral / nasal ulcers, cough, shortness of breath, LAD, serositis, blood clots, miscarriage, hair loss, sun sensitivity, Rayanuds, anemia, organ issues.  +Low-grade temp few nights/wk 100.4F, fatigue, weakness, seizure d/o?in childhood 4y/o, rash on back, joint pain, am stiffness.  ?   PMH: DM, CAD, CVA (15 y/o at school), HTN, GERD, HLD, DDD Lumbar, OA b/l shoulders  Social Hx: Denies TBO, drug use or ETOH  FH AI dz: mom w/ RA  Review of Systems  General: Denies chills and fever  Ophthalmologic: Denies discharge. Denies flashes of light in visual field  ENT: Denies decreased sense of smell. Denies nosebleeds.?  Endocrine: Denies  excessive sweating. Denies weight loss  Respiratory: Denies hemoptysis. Denies tuberculosis.  Cardiovascular: Denies cyanosis. Denies rheumatic fever.  Gastrointestinal: Denies difficulty swallowing. Denies hematemesis.  Hematology: Denies bleeding problems. Denies recent transfusion.  Peripheral vascular: Denies absent pulses in feet. Denies ulceration of the feet  Skin: Denies blistering of skin. Denies skin cancer.  Neurologic denies seizures. Denies stroke.  Psychiatric: Denies eating disorder. Denies loss of appetite.?  Physical Exam  Vitals & Measurements  T:?36.7? ?C (Oral)? HR:?84(Peripheral)? RR:?18? BP:?129/85?  HT:?165.00?cm? WT:?134.500?kg? BMI:?49.4?  General examination: Alert, pleasant, in no acute distress.  Head: Normocephalic, atraumatic.  Eyes: Conjunctiva clear, upper eyelids normal, lower eyelids normal.  Oral cavity: Mucosa moist, no lesions, fair dentition.  Throat: No exudate, no erythema.  Neck/thyroid: Neck supple, no lymphadenopathy, no thyroid nodules  Skin: No suspicious lesions, warm and dry. Thumb nail changes. Faded rash on back.  Heart: S1, S2 normal, regular rate and rhythm. No JVD.  Lungs: Clear to auscultation, no accessory muscle use.  Musculoskeletal: Fullness in the 2-3 MCPs.?FROM.? No deformities.  Extremities: No clubbing or cyanosis.  Neurologic: Alert and oriented, cranial nerves II through XII grossly intact. Muscle strength grossly intact. Sensation grossly intact.  Psych: Cognitive function intact, mood/affect full range.?  ?   (09/15/2020 14:42 CDT XR Foot Right Minimum 3 Views)  Radiology Report  Clinical History:  Pain  ?  Technique:  Radiographs of the right foot with AP, lateral and oblique ?views.  ?  Comparison:  No prior imaging available for comparison.  ?  Findings:  There is no acute fracture, subluxation or dislocation.  Joints and interspaces appear maintained.  Osseous structures show normal bone mineral density.  Soft tissues are unremarkable.  There are  no radiopaque foreign bodies.  ?  Impression:  No acute osseous abnormality, fracture, or dislocation.  ?  There is no significant degenerative change. [1]  ?  (09/15/2020 14:42 CDT XR Hand Left Minimum 3 Views)  Reason For Exam  Pain  ?  Radiology Report  3 views both hands (6 images)  ?  INDICATION: Bilateral hand pain  ?  No comparison  ?  FINDINGS:  ?  There are mild periarticular erosive changes along the radial aspect  of both second metatarsal tarsal bases. These are well-corticated  suggesting a chronic process. No fracture or dislocation. No  significant interphalangeal joint space narrowing.  ?  IMPRESSION:  ?  Chronic erosive changes on the radial bases of both second metatarsals  without acute findings [2]  ?  (09/15/2020 14:42 CDT XR Hand Right Minimum 3 Views)  Radiology Report  3 views both hands (6 images)  ?  INDICATION: Bilateral hand pain  ?  No comparison  ?  FINDINGS:  ?  There are mild periarticular erosive changes along the radial aspect  of both second metatarsal tarsal bases. These are well-corticated  suggesting a chronic process. No fracture or dislocation. No  significant interphalangeal joint space narrowing.  ?  IMPRESSION:  ?  Chronic erosive changes on the radial bases of both second metatarsals  without acute findings [3]  ?  (09/15/2020 14:42 CDT XR Foot Left Minimum 3 Views)  Radiology Report  Clinical History:  Pain  ?  Technique:  Radiographs of the left foot with AP, lateral and oblique ?views.  ?  Comparison:  9/15/2020  ?  Findings:  There is no acute fracture, subluxation or dislocation.  Joints and interspaces appear maintained.  Osseous structures show normal bone mineral density.  Soft tissues are unremarkable.  There are no radiopaque foreign bodies.  ?  Impression:  No acute osseous abnormality, fracture, or dislocation.  ?  There is no significant degenerative change. [4]  Assessment/Plan  1. Inflammatory?Joint pain. Gives inflammatory pattern of joint pain w/ an hour  or so of morning stiffness and hx of flares and gelling.?Exam is without overt synovitis, some ?fullness in the MCP (? habitus). Inflammatory markers are elevated w/ CRP 1.5 (<0.5) and ESR 25 (<20). States her mother has RA. Requesting bx of rash on the back, rule out possibility of PsO. Will send for  BID and consider MTX. She will call her Ophthalmologist to discuss HCQ prior to starting. 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  2. VIVIAN.?1:80 speckled by unknown lab. Repeat by [facility] returned negative as well as JENNIFER, dsDNA.  3. Rash and nail changes. Rash on back has been?lingering-improves and then?worsens.?One small scaly area visible and has some thickened?nail changes to?b/l thumb nails. She has seen derm and had bx and was given topical. States the topicals help w/ the rash but it has persisted. Will request path to discern from PsO. Encouraged pt to follow up for further tx.  4. Cervical radiculopathy. Check cervical spinal xrays. Reports a numbness and weakness in the arms.  5. FH of Colon Cancer. Recently Cscope w/ tubular adenoma. Complicated by hemorrhoidal bleeding post-procedure. Recommendation is to repeat scop in 3-5 years. Pt mother w/ colon ca.  6. HX of CVA at age 16. Denies any other issues w/ clotting, no miscarriage. APLA and LA returned negative.  7. DM. CCC.  8. HTN. CCC  9. HLD. CCC  10. GERD. avoid NSAIDS. CCC  ?   ?RTC 2 months.  Referrals  Clinic Follow up, *Est. 03/08/21 14:00:00 CST, Order for future visit, Seronegative rheumatoid arthritis, [facility]   Problem List/Past Medical History  Ongoing  Bleeding hemorrhoids  Chronic pain  Colon cancer screening  CVA - Cerebrovascular accident  Degeneration of intervertebral disc  Disorder of optic nerve  DM - Diabetes mellitus  Elevated C-reactive protein (CRP)  Elevated sed rate  Environmental allergy  Erosive osteoarthrosis  External hemorrhoid  Family history of cancer of colon  Fatigue  GERD - Gastro-esophageal reflux  disease  Heart murmur  Hot flashes  HTN - Hypertension  Hyperlipidemia  Joint pain  Joint swelling  Neuropathy  Pain in joint involving multiple sites  Rectal bleeding  Rectal polyp  Restless legs  Sleep apnea  Tachycardia  Thrombosed hemorrhoids  Vitamin deficiency  Historical  VIVIAN positive  CAD - Coronary artery disease  Diabetes mellitus  GERD - Gastro-esophageal reflux disease  Hypertension  RA - Rheumatoid arthritis  Procedure/Surgical History  Colonoscopy (11/24/2020)  Colonoscopy, flexible; diagnostic, including collection of specimen(s) by brushing or washing, when performed (separate procedure) (11/24/2020)  Inspection of Lower Intestinal Tract, Via Natural or Artificial Opening Endoscopic (11/24/2020)  Unlisted procedure, anus (11/24/2020)  Colonoscopy w/polypectomy (11/19/2020)  Biopsy Gastrointestinal (05/01/2018)  Esophagogastroduodenoscopy (05/01/2018)  Esophagogastroduodenoscopy, flexible, transoral; with biopsy, single or multiple (05/01/2018)  Excision of Stomach, Pylorus, Via Natural or Artificial Opening Endoscopic, Diagnostic (05/01/2018)  Cholecystectomy  Colonoscopy, flexible; diagnostic, including collection of specimen(s) by brushing or washing, when performed (separate procedure)  Hysterectomy  Lipoma of back   Medications  albuterol CFC free 90 mcg/inh inhalation aerosol with adapter, 2 puff(s), INH, q6hr  atorvastatin 20 mg oral tablet, 20 mg= 1 tab(s), Oral, Daily  betamethasone-clotrimazole 0.05%-1% topical cream, TOP, BID  dicyclomine 10 mg oral capsule, 10 mg= 1 cap(s), Oral, TID, PRN, 11 refills  doxycycline hyclate 100 mg oral capsule, 100 mg= 1 cap(s), Oral, BID  doxycycline monohydrate 100 mg oral capsule, 100 mg= 1 cap(s), Oral, BID  DULoxetine 60 mg oral delayed release capsule, 60 mg= 1 cap(s), Oral, Daily, 11 refills  Fish Oil 1200 mg oral capsule, 1200 mg= 1 cap(s), Oral, BID  Flonase 50 mcg/inh nasal spray, 1 spray(s), Nasal, BID, 11 refills  Flovent  mcg/inh  inhalation aerosol, 220 mcg, INH, BID, 11 refills  folic acid 1 mg oral tablet, 1 mg= 1 tab(s), Oral, Daily, 11 refills  gabapentin 400 mg oral capsule, 400 mg= 1 cap(s), Oral, At Bedtime, 11 refills  Glucose Test Strips, See Instructions, 11 refills  ipratropium 42 mcg/inh (0.06%) nasal spray, 2 spray(s), Nasal, QID,? ?Not Taking, Completed Rx  levoFLOXacin 500 mg oral tablet, 500 mg= 1 tab(s), Oral, q24hr  losartan 100 mg oral tablet, 100 mg= 1 tab(s), Oral, Daily, 11 refills  losartan 50 mg oral tablet, 50 mg= 1 tab(s), Oral, Daily  metformin 500 mg oral tablet, 500 mg= 1 tab(s), Oral, BID, 11 refills  montelukast 10 mg oral TABLET, 10 mg= 1 tab(s), Oral, Daily, 1 refills  NuLYTELY with Flavor Packs oral powder for reconstitution, 240 mL, Oral, Daily,? ?Not Taking, Completed Rx  Pantoprazole 40 mg ORAL EC-Tablet, 40 mg= 1 tab(s), Oral, Daily, 1 refills  Plaquenil 200 mg oral tablet, 200 mg= 1 tab(s), Oral, BID, 2 refills  Ranexa 500 mg oral tablet, extended release, 500 mg= 1 tab(s), Oral, BID  tiZANidine 4 mg oral tablet, 4 mg= 1 tab(s), Oral, TID  triamcinolone 0.1% topical ointment, TOP, TID  verapamil 360 mg/24 hours oral capsule, extended release, 360 mg= 1 cap(s), Oral, Daily  Vitamin D3 5000 intl units (125 mcg) oral capsule, 5000 IntUnit= 1 cap(s), Oral, Daily, 11 refills  Zyrtec 10 mg oral tablet, 10 mg= 1 tab(s), Oral, Daily, 11 refills  Allergies  Keflex?(SEVERE ABDOMINAL PAIN)  Valium?(TACHYCARDIA)  penicillin?(RASH)  Social History  Abuse/Neglect  No, No, Yes, 12/02/2020  Alcohol  Never, Alcohol use interferes with work or home: No. Others hurt by drinking: No. Household alcohol concerns: No., 11/17/2020  Employment/School  Employed, 11/17/2020  Exercise  Exercise duration: 10. Exercise frequency: Daily. Exercise type: Walking., 11/17/2020  Home/Environment  Lives with Children. Living situation: Home/Independent. TV/Computer concerns: No., 11/17/2020  Nutrition/Health  Regular, Good,  11/17/2020  Sexual  Sexually active: Yes. Gender Identity Identifies as female., 11/17/2020  Substance Use  Never, Drug use interferes with work/home: No. Household substance abuse concerns: No., 11/17/2020  Tobacco  Never (less than 100 in lifetime), N/A, Household tobacco concerns: No., 12/29/2020  Family History  COPD (chronic obstructive pulmonary disease).: Father.  MI - myocardial infarction: Father.  Osteoarthritis: Father.  Primary malignant neoplasm of colon: Mother.  Rheumatoid arthritis: Mother.  Immunizations  Vaccine Date Status   influenza virus vaccine, inactivated 11/24/2020 Given   influenza virus vaccine, inactivated 12/11/2019 Recorded   influenza virus vaccine, inactivated 11/05/2018 Recorded   Health Maintenance  Health Maintenance  ???Pending?(in the next year)  ??? ??OverDue  ??? ? ? ?Diabetes Maintenance-HgbA1c due??12/10/20??and every 1??year(s)  ??? ??Due?  ??? ? ? ?Breast Cancer Screening due??12/29/20??Unknown Frequency  ??? ? ? ?COPD Maintenance-Spirometry due??12/29/20??Unknown Frequency  ??? ? ? ?Cervical Cancer Screening due??12/29/20??Unknown Frequency  ??? ? ? ?Diabetes Maintenance-Eye Exam due??12/29/20??Unknown Frequency  ??? ? ? ?Diabetes Maintenance-Fasting Lipid Profile due??12/29/20??Variable frequency  ??? ? ? ?Diabetes Maintenance-Foot Exam due??12/29/20??Unknown Frequency  ??? ? ? ?Hypertension Management-Education due??12/29/20??and every 1??year(s)  ??? ? ? ?Tetanus Vaccine due??12/29/20??and every 10??year(s)  ??? ? ? ?Zoster Vaccine due??12/29/20??Unknown Frequency  ??? ??Due In Future?  ??? ? ? ?Obesity Screening not due until??01/01/21??and every 1??year(s)  ??? ? ? ?Alcohol Misuse Screening not due until??01/02/21??and every 1??year(s)  ??? ? ? ?Hypertension Management-BMP not due until??09/15/21??and every 1??year(s)  ??? ? ? ?Diabetes Maintenance-Serum Creatinine not due until??09/15/21??and every 1??year(s)  ??? ? ? ?Influenza Vaccine not due until??10/01/21??and  every 1??day(s)  ??? ? ? ?Depression Screening not due until??12/02/21??and every 1??year(s)  ???Satisfied?(in the past 1 year)  ??? ??Satisfied?  ??? ? ? ?ADL Screening on??12/29/20.??Satisfied by Barbara Jerez LPN  ??? ? ? ?Alcohol Misuse Screening on??11/17/20.??Satisfied by Alysia Galaviz LPN F.  ??? ? ? ?Blood Pressure Screening on??12/29/20.??Satisfied by Barbara Jerez LPN Mistric  ??? ? ? ?Body Mass Index Check on??12/29/20.??Satisfied by Barbara Jerez LPN  ??? ? ? ?Breast Cancer Screening on??08/21/20.??Satisfied by Duarte VALLE, Nelly Colunga  ??? ? ? ?Colorectal Screening on??11/25/20.??Satisfied by Candie Esquivel  ??? ? ? ?Coronary Artery Disease Maintenance-Lipid Lowering Therapy on??12/01/20.  ??? ? ? ?Depression Screening on??12/02/20.??Satisfied by Alysia Galaviz LPN F.  ??? ? ? ?Diabetes Maintenance-Serum Creatinine on??09/15/20.??Satisfied by Dwight Wylie  ??? ? ? ?Diabetes Screening on??09/15/20.??Satisfied by Dwight Wylie  ??? ? ? ?Hypertension Management-Blood Pressure on??12/29/20.??Satisfied by Barbara Jerez LPN Mistric  ??? ? ? ?Influenza Vaccine on??11/24/20.??Satisfied by Vernell Hobson RN  ??? ? ? ?Obesity Screening on??12/29/20.??Satisfied by Barbara Jerez LPN Mistric  ?     [1]?XR Foot Right Minimum 3 Views; Dwight Rg MD 09/15/2020 14:42 CDT  [2]?XR Hand Left Minimum 3 Views; Lazarus Polanco MD 09/15/2020 14:42 CDT  [3]?XR Hand Right Minimum 3 Views; Collin VALLE, Lazarus Maldonado 09/15/2020 14:42 CDT  [4]?XR Foot Left Minimum 3 Views; Ifrah VALLE, Dwight Arevalo 09/15/2020 14:42 CDT   Ms. Tay called and is concerned about taking HCQ w/already existing eye issues. We discussed leflunomide 10mg /d and she is open to this. Yamini sent it into her pharmacy. Also, discussed results of cervical spinal xray--degenerative changes w/ osteophyte formatio and some narrowing at C5-6. She is open to trying PT. She will call her insurance about  an in-network PT near her home and get back to us to make the formal referral.  ?  AA  ?  ?  ?  Radiology Report  Cervical spine for more views.  ?  HISTORY: Radiculopathy.  ?  FINDINGS: Examination is suboptimal in that C7 was not optimally seen  in spite of the swimmers projection.  ?  There is straightening of the normal curvature of the cervical spine  with degenerative changes with some marginal osteophytes and narrowing  at C5-C6 there are some degenerative changes of the uncovertebral  joints on the oblique projections there might be some narrowing of C5  and C6 neural foramina on the right and C5 on the left.  ?  No acute fractures or dislocations identified the prevertebral soft  tissues appear to be unremarkable.  ?  IMPRESSION: Degenerative changes  ?

## 2022-08-25 ENCOUNTER — HISTORICAL (OUTPATIENT)
Dept: ADMINISTRATIVE | Facility: HOSPITAL | Age: 52
End: 2022-08-25

## 2022-08-25 LAB — BCS RECOMMENDATION EXT: NORMAL

## 2023-01-24 RX ORDER — DULOXETIN HYDROCHLORIDE 60 MG/1
60 CAPSULE, DELAYED RELEASE ORAL DAILY
COMMUNITY
End: 2023-01-24 | Stop reason: SDUPTHER

## 2023-01-25 RX ORDER — DULOXETIN HYDROCHLORIDE 60 MG/1
60 CAPSULE, DELAYED RELEASE ORAL DAILY
Qty: 30 CAPSULE | Refills: 1 | Status: SHIPPED | OUTPATIENT
Start: 2023-01-25 | End: 2023-03-21 | Stop reason: SDUPTHER

## 2023-01-26 ENCOUNTER — TELEPHONE (OUTPATIENT)
Dept: FAMILY MEDICINE | Facility: CLINIC | Age: 53
End: 2023-01-26

## 2023-01-26 DIAGNOSIS — E78.5 HYPERLIPIDEMIA, UNSPECIFIED HYPERLIPIDEMIA TYPE: Primary | ICD-10-CM

## 2023-01-26 DIAGNOSIS — E56.9 VITAMIN DEFICIENCY: ICD-10-CM

## 2023-01-26 RX ORDER — ATORVASTATIN CALCIUM 80 MG/1
80 TABLET, FILM COATED ORAL NIGHTLY
COMMUNITY
Start: 2022-12-22 | End: 2023-01-26 | Stop reason: SDUPTHER

## 2023-01-26 RX ORDER — ATORVASTATIN CALCIUM 80 MG/1
80 TABLET, FILM COATED ORAL NIGHTLY
Qty: 30 TABLET | Refills: 0 | Status: SHIPPED | OUTPATIENT
Start: 2023-01-26 | End: 2023-02-27

## 2023-01-26 RX ORDER — FOLIC ACID 1 MG/1
1000 TABLET ORAL DAILY
Qty: 30 TABLET | Refills: 0 | Status: SHIPPED | OUTPATIENT
Start: 2023-01-26 | End: 2023-02-27

## 2023-01-26 RX ORDER — FOLIC ACID 1 MG/1
1000 TABLET ORAL
COMMUNITY
Start: 2022-12-22 | End: 2023-01-26 | Stop reason: SDUPTHER

## 2023-02-01 ENCOUNTER — DOCUMENTATION ONLY (OUTPATIENT)
Dept: ADMINISTRATIVE | Facility: HOSPITAL | Age: 53
End: 2023-02-01
Payer: MEDICAID

## 2023-02-13 ENCOUNTER — TELEPHONE (OUTPATIENT)
Dept: FAMILY MEDICINE | Facility: CLINIC | Age: 53
End: 2023-02-13
Payer: MEDICAID

## 2023-02-14 ENCOUNTER — TELEPHONE (OUTPATIENT)
Dept: FAMILY MEDICINE | Facility: CLINIC | Age: 53
End: 2023-02-14
Payer: MEDICAID

## 2023-02-14 DIAGNOSIS — K21.9 GASTROESOPHAGEAL REFLUX DISEASE, UNSPECIFIED WHETHER ESOPHAGITIS PRESENT: Primary | ICD-10-CM

## 2023-02-14 RX ORDER — PANTOPRAZOLE SODIUM 40 MG/1
40 TABLET, DELAYED RELEASE ORAL DAILY
Qty: 30 TABLET | Refills: 2 | Status: SHIPPED | OUTPATIENT
Start: 2023-02-14 | End: 2023-05-17 | Stop reason: SDUPTHER

## 2023-02-14 RX ORDER — PANTOPRAZOLE SODIUM 40 MG/1
40 TABLET, DELAYED RELEASE ORAL DAILY
COMMUNITY
Start: 2023-01-11 | End: 2023-02-14 | Stop reason: SDUPTHER

## 2023-02-27 DIAGNOSIS — E56.9 VITAMIN DEFICIENCY: ICD-10-CM

## 2023-02-27 DIAGNOSIS — E78.5 HYPERLIPIDEMIA, UNSPECIFIED HYPERLIPIDEMIA TYPE: ICD-10-CM

## 2023-02-27 LAB
LEFT EYE DM RETINOPATHY: NEGATIVE
RIGHT EYE DM RETINOPATHY: NEGATIVE

## 2023-02-27 RX ORDER — FOLIC ACID 1 MG/1
TABLET ORAL
Qty: 30 TABLET | Refills: 0 | Status: SHIPPED | OUTPATIENT
Start: 2023-02-27 | End: 2023-04-05 | Stop reason: SDUPTHER

## 2023-02-27 RX ORDER — ATORVASTATIN CALCIUM 80 MG/1
TABLET, FILM COATED ORAL
Qty: 30 TABLET | Refills: 0 | Status: SHIPPED | OUTPATIENT
Start: 2023-02-27 | End: 2023-04-05 | Stop reason: SDUPTHER

## 2023-03-01 VITALS
TEMPERATURE: 99 F | HEIGHT: 65 IN | OXYGEN SATURATION: 96 % | DIASTOLIC BLOOD PRESSURE: 70 MMHG | WEIGHT: 291.88 LBS | HEART RATE: 76 BPM | SYSTOLIC BLOOD PRESSURE: 128 MMHG | BODY MASS INDEX: 48.63 KG/M2

## 2023-03-01 RX ORDER — METOPROLOL SUCCINATE 50 MG/1
50 TABLET, EXTENDED RELEASE ORAL DAILY
COMMUNITY
Start: 2023-02-03

## 2023-03-03 ENCOUNTER — DOCUMENTATION ONLY (OUTPATIENT)
Dept: FAMILY MEDICINE | Facility: CLINIC | Age: 53
End: 2023-03-03
Payer: MEDICAID

## 2023-03-20 ENCOUNTER — TELEPHONE (OUTPATIENT)
Dept: FAMILY MEDICINE | Facility: CLINIC | Age: 53
End: 2023-03-20
Payer: MEDICAID

## 2023-03-20 DIAGNOSIS — E11.9 TYPE 2 DIABETES MELLITUS WITHOUT COMPLICATION, WITHOUT LONG-TERM CURRENT USE OF INSULIN: Primary | ICD-10-CM

## 2023-03-21 ENCOUNTER — TELEPHONE (OUTPATIENT)
Dept: FAMILY MEDICINE | Facility: CLINIC | Age: 53
End: 2023-03-21
Payer: MEDICAID

## 2023-03-21 DIAGNOSIS — E11.9 TYPE 2 DIABETES MELLITUS WITHOUT COMPLICATION, WITHOUT LONG-TERM CURRENT USE OF INSULIN: ICD-10-CM

## 2023-03-21 DIAGNOSIS — F41.9 ANXIETY: Primary | ICD-10-CM

## 2023-03-21 RX ORDER — DULOXETIN HYDROCHLORIDE 60 MG/1
60 CAPSULE, DELAYED RELEASE ORAL DAILY
Qty: 30 CAPSULE | Refills: 1 | Status: SHIPPED | OUTPATIENT
Start: 2023-03-21 | End: 2023-04-17 | Stop reason: SDUPTHER

## 2023-03-23 ENCOUNTER — TELEPHONE (OUTPATIENT)
Dept: FAMILY MEDICINE | Facility: CLINIC | Age: 53
End: 2023-03-23
Payer: MEDICAID

## 2023-03-23 DIAGNOSIS — M62.838 NIGHT MUSCLE SPASMS: Primary | ICD-10-CM

## 2023-03-23 DIAGNOSIS — T78.40XS ALLERGY, SEQUELA: Primary | ICD-10-CM

## 2023-03-23 RX ORDER — CETIRIZINE HYDROCHLORIDE 10 MG/1
10 TABLET ORAL DAILY
Qty: 30 TABLET | Refills: 1 | Status: SHIPPED | OUTPATIENT
Start: 2023-03-23 | End: 2023-05-23 | Stop reason: SDUPTHER

## 2023-03-23 RX ORDER — TIZANIDINE 4 MG/1
TABLET ORAL
Qty: 90 TABLET | Refills: 0 | Status: SHIPPED | OUTPATIENT
Start: 2023-03-23 | End: 2023-04-26 | Stop reason: SDUPTHER

## 2023-03-23 RX ORDER — MONTELUKAST SODIUM 10 MG/1
TABLET ORAL
Qty: 30 TABLET | Refills: 1 | Status: SHIPPED | OUTPATIENT
Start: 2023-03-23 | End: 2023-05-23 | Stop reason: SDUPTHER

## 2023-03-23 NOTE — TELEPHONE ENCOUNTER
HENRY Rain LPN    ----- Message from Malorie Armando sent at 3/23/2023  3:19 PM CDT -----  Regarding: Refill  montelukast (SINGULAIR) 10 mg tablet  tiZANidine (ZANAFLEX) 4 MG tablet  cetirizine (ZYRTEC) 10 MG tablet    [facility]     All 3 are 1x daily     Maggi Hatfield   [phone]

## 2023-03-30 ENCOUNTER — TELEPHONE (OUTPATIENT)
Dept: FAMILY MEDICINE | Facility: CLINIC | Age: 53
End: 2023-03-30
Payer: MEDICAID

## 2023-03-31 ENCOUNTER — TELEPHONE (OUTPATIENT)
Dept: FAMILY MEDICINE | Facility: CLINIC | Age: 53
End: 2023-03-31
Payer: MEDICAID

## 2023-04-03 ENCOUNTER — TELEPHONE (OUTPATIENT)
Dept: FAMILY MEDICINE | Facility: CLINIC | Age: 53
End: 2023-04-03
Payer: MEDICAID

## 2023-04-04 PROCEDURE — 84443 ASSAY THYROID STIM HORMONE: CPT | Performed by: NURSE PRACTITIONER

## 2023-04-04 PROCEDURE — 85025 COMPLETE CBC W/AUTO DIFF WBC: CPT | Performed by: NURSE PRACTITIONER

## 2023-04-04 PROCEDURE — 80061 LIPID PANEL: CPT | Performed by: NURSE PRACTITIONER

## 2023-04-04 PROCEDURE — 84439 ASSAY OF FREE THYROXINE: CPT | Performed by: NURSE PRACTITIONER

## 2023-04-04 PROCEDURE — 80053 COMPREHEN METABOLIC PANEL: CPT | Performed by: NURSE PRACTITIONER

## 2023-04-04 PROCEDURE — 83036 HEMOGLOBIN GLYCOSYLATED A1C: CPT | Performed by: NURSE PRACTITIONER

## 2023-04-05 ENCOUNTER — TELEPHONE (OUTPATIENT)
Dept: FAMILY MEDICINE | Facility: CLINIC | Age: 53
End: 2023-04-05
Payer: MEDICAID

## 2023-04-05 VITALS
HEART RATE: 76 BPM | HEIGHT: 65 IN | DIASTOLIC BLOOD PRESSURE: 70 MMHG | WEIGHT: 291.88 LBS | SYSTOLIC BLOOD PRESSURE: 128 MMHG | BODY MASS INDEX: 48.63 KG/M2 | OXYGEN SATURATION: 96 % | TEMPERATURE: 99 F

## 2023-04-05 DIAGNOSIS — E56.9 VITAMIN DEFICIENCY: ICD-10-CM

## 2023-04-05 DIAGNOSIS — E78.5 HYPERLIPIDEMIA, UNSPECIFIED HYPERLIPIDEMIA TYPE: ICD-10-CM

## 2023-04-05 RX ORDER — ISOSORBIDE MONONITRATE 60 MG/1
60 TABLET, EXTENDED RELEASE ORAL DAILY
COMMUNITY
Start: 2023-03-30

## 2023-04-05 RX ORDER — FOLIC ACID 1 MG/1
1000 TABLET ORAL DAILY
Qty: 30 TABLET | Refills: 0 | Status: SHIPPED | OUTPATIENT
Start: 2023-04-05 | End: 2023-05-17 | Stop reason: SDUPTHER

## 2023-04-05 RX ORDER — ATORVASTATIN CALCIUM 80 MG/1
80 TABLET, FILM COATED ORAL NIGHTLY
Qty: 30 TABLET | Refills: 0 | Status: SHIPPED | OUTPATIENT
Start: 2023-04-05 | End: 2023-04-18 | Stop reason: ALTCHOICE

## 2023-04-17 ENCOUNTER — TELEPHONE (OUTPATIENT)
Dept: FAMILY MEDICINE | Facility: CLINIC | Age: 53
End: 2023-04-17
Payer: MEDICAID

## 2023-04-17 DIAGNOSIS — F41.9 ANXIETY: ICD-10-CM

## 2023-04-17 RX ORDER — DULOXETIN HYDROCHLORIDE 60 MG/1
60 CAPSULE, DELAYED RELEASE ORAL DAILY
Qty: 30 CAPSULE | Refills: 1 | Status: SHIPPED | OUTPATIENT
Start: 2023-04-17

## 2023-04-18 ENCOUNTER — OFFICE VISIT (OUTPATIENT)
Dept: FAMILY MEDICINE | Facility: CLINIC | Age: 53
End: 2023-04-18
Payer: MEDICAID

## 2023-04-18 VITALS
WEIGHT: 293 LBS | BODY MASS INDEX: 48.82 KG/M2 | OXYGEN SATURATION: 96 % | DIASTOLIC BLOOD PRESSURE: 70 MMHG | HEART RATE: 61 BPM | TEMPERATURE: 98 F | HEIGHT: 65 IN | SYSTOLIC BLOOD PRESSURE: 128 MMHG

## 2023-04-18 DIAGNOSIS — M54.9 DORSALGIA, UNSPECIFIED: ICD-10-CM

## 2023-04-18 DIAGNOSIS — G89.29 CHRONIC LOW BACK PAIN, UNSPECIFIED BACK PAIN LATERALITY, UNSPECIFIED WHETHER SCIATICA PRESENT: ICD-10-CM

## 2023-04-18 DIAGNOSIS — M54.50 CHRONIC LOW BACK PAIN, UNSPECIFIED BACK PAIN LATERALITY, UNSPECIFIED WHETHER SCIATICA PRESENT: ICD-10-CM

## 2023-04-18 DIAGNOSIS — R19.7 INTERMITTENT DIARRHEA: ICD-10-CM

## 2023-04-18 DIAGNOSIS — D64.9 ANEMIA, UNSPECIFIED TYPE: ICD-10-CM

## 2023-04-18 DIAGNOSIS — R74.8 ELEVATED LIVER ENZYMES: ICD-10-CM

## 2023-04-18 DIAGNOSIS — R20.0 NUMBNESS AND TINGLING OF LEFT LEG: ICD-10-CM

## 2023-04-18 DIAGNOSIS — E11.40 TYPE 2 DIABETES MELLITUS WITH DIABETIC NEUROPATHY, WITHOUT LONG-TERM CURRENT USE OF INSULIN: ICD-10-CM

## 2023-04-18 DIAGNOSIS — Z00.01 ENCOUNTER FOR ROUTINE ADULT HEALTH EXAMINATION WITH ABNORMAL FINDINGS: Primary | ICD-10-CM

## 2023-04-18 DIAGNOSIS — E78.5 HYPERLIPIDEMIA, UNSPECIFIED HYPERLIPIDEMIA TYPE: ICD-10-CM

## 2023-04-18 DIAGNOSIS — R20.2 NUMBNESS AND TINGLING OF LEFT LEG: ICD-10-CM

## 2023-04-18 PROBLEM — R53.83 FATIGUE: Status: ACTIVE | Noted: 2023-04-18

## 2023-04-18 PROBLEM — N95.1 HOT FLASHES DUE TO MENOPAUSE: Status: ACTIVE | Noted: 2023-04-18

## 2023-04-18 PROBLEM — I99.8 VASCULAR INSUFFICIENCY: Status: ACTIVE | Noted: 2023-04-18

## 2023-04-18 PROBLEM — R76.8 POSITIVE ANTINUCLEAR ANTIBODY: Status: ACTIVE | Noted: 2023-04-18

## 2023-04-18 PROBLEM — Z80.0 FAMILY HISTORY OF MALIGNANT NEOPLASM OF COLON: Status: ACTIVE | Noted: 2023-04-18

## 2023-04-18 PROBLEM — G47.33 OBSTRUCTIVE SLEEP APNEA: Status: ACTIVE | Noted: 2023-04-18

## 2023-04-18 PROBLEM — M15.4 EROSIVE OSTEOARTHRITIS: Status: ACTIVE | Noted: 2023-04-18

## 2023-04-18 PROBLEM — K21.9 GASTROESOPHAGEAL REFLUX DISEASE: Status: ACTIVE | Noted: 2023-04-18

## 2023-04-18 PROBLEM — J30.9 ALLERGIC RHINITIS: Status: ACTIVE | Noted: 2023-04-18

## 2023-04-18 PROBLEM — E11.9 DIABETES MELLITUS: Status: ACTIVE | Noted: 2023-04-18

## 2023-04-18 PROBLEM — I10 ESSENTIAL HYPERTENSION: Status: ACTIVE | Noted: 2023-04-18

## 2023-04-18 PROBLEM — R79.82 HIGH C-REACTIVE PROTEIN: Status: ACTIVE | Noted: 2023-04-18

## 2023-04-18 PROBLEM — Z82.49 FAMILY HISTORY OF CORONARY ARTERY DISEASE: Status: ACTIVE | Noted: 2023-04-18

## 2023-04-18 PROBLEM — G56.02 CARPAL TUNNEL SYNDROME OF LEFT WRIST: Status: ACTIVE | Noted: 2023-04-18

## 2023-04-18 PROBLEM — M25.50 POLYARTHRALGIA: Status: ACTIVE | Noted: 2023-04-18

## 2023-04-18 PROBLEM — G62.9 NEUROPATHY: Status: ACTIVE | Noted: 2023-04-18

## 2023-04-18 PROBLEM — R00.0 SINUS TACHYCARDIA: Status: ACTIVE | Noted: 2023-04-18

## 2023-04-18 PROBLEM — G25.81 RESTLESS LEGS SYNDROME: Status: ACTIVE | Noted: 2023-04-18

## 2023-04-18 PROBLEM — R01.1 HEART MURMUR: Status: ACTIVE | Noted: 2023-04-18

## 2023-04-18 PROBLEM — E55.9 VITAMIN D DEFICIENCY: Status: ACTIVE | Noted: 2017-05-25

## 2023-04-18 PROBLEM — R00.2 PALPITATIONS: Status: ACTIVE | Noted: 2023-04-18

## 2023-04-18 PROBLEM — I25.10 ARTERIOSCLEROSIS OF CORONARY ARTERY: Status: ACTIVE | Noted: 2023-04-18

## 2023-04-18 PROBLEM — K62.1 RECTAL POLYP: Status: ACTIVE | Noted: 2023-04-18

## 2023-04-18 LAB
FERRITIN SERPL-MCNC: 68.6 NG/ML (ref 6.24–264)
FOLATE SERPL-MCNC: 19.1 NG/ML (ref 2.8–20)
VIT B12 SERPL-MCNC: 336 PG/ML (ref 211–946)

## 2023-04-18 PROCEDURE — 3008F PR BODY MASS INDEX (BMI) DOCUMENTED: ICD-10-PCS | Mod: CPTII,,, | Performed by: NURSE PRACTITIONER

## 2023-04-18 PROCEDURE — 3008F BODY MASS INDEX DOCD: CPT | Mod: CPTII,,, | Performed by: NURSE PRACTITIONER

## 2023-04-18 PROCEDURE — 4010F PR ACE/ARB THEARPY RXD/TAKEN: ICD-10-PCS | Mod: CPTII,,, | Performed by: NURSE PRACTITIONER

## 2023-04-18 PROCEDURE — 3078F DIAST BP <80 MM HG: CPT | Mod: CPTII,,, | Performed by: NURSE PRACTITIONER

## 2023-04-18 PROCEDURE — 1159F MED LIST DOCD IN RCRD: CPT | Mod: CPTII,,, | Performed by: NURSE PRACTITIONER

## 2023-04-18 PROCEDURE — 1159F PR MEDICATION LIST DOCUMENTED IN MEDICAL RECORD: ICD-10-PCS | Mod: CPTII,,, | Performed by: NURSE PRACTITIONER

## 2023-04-18 PROCEDURE — 83550 IRON BINDING TEST: CPT | Performed by: NURSE PRACTITIONER

## 2023-04-18 PROCEDURE — 99396 PR PREVENTIVE VISIT,EST,40-64: ICD-10-PCS | Mod: ,,, | Performed by: NURSE PRACTITIONER

## 2023-04-18 PROCEDURE — 3074F PR MOST RECENT SYSTOLIC BLOOD PRESSURE < 130 MM HG: ICD-10-PCS | Mod: CPTII,,, | Performed by: NURSE PRACTITIONER

## 2023-04-18 PROCEDURE — 1160F PR REVIEW ALL MEDS BY PRESCRIBER/CLIN PHARMACIST DOCUMENTED: ICD-10-PCS | Mod: CPTII,,, | Performed by: NURSE PRACTITIONER

## 2023-04-18 PROCEDURE — 1160F RVW MEDS BY RX/DR IN RCRD: CPT | Mod: CPTII,,, | Performed by: NURSE PRACTITIONER

## 2023-04-18 PROCEDURE — 3078F PR MOST RECENT DIASTOLIC BLOOD PRESSURE < 80 MM HG: ICD-10-PCS | Mod: CPTII,,, | Performed by: NURSE PRACTITIONER

## 2023-04-18 PROCEDURE — 99396 PREV VISIT EST AGE 40-64: CPT | Mod: ,,, | Performed by: NURSE PRACTITIONER

## 2023-04-18 PROCEDURE — 82746 ASSAY OF FOLIC ACID SERUM: CPT | Performed by: NURSE PRACTITIONER

## 2023-04-18 PROCEDURE — 82728 ASSAY OF FERRITIN: CPT | Performed by: NURSE PRACTITIONER

## 2023-04-18 PROCEDURE — 4010F ACE/ARB THERAPY RXD/TAKEN: CPT | Mod: CPTII,,, | Performed by: NURSE PRACTITIONER

## 2023-04-18 PROCEDURE — 82607 VITAMIN B-12: CPT | Performed by: NURSE PRACTITIONER

## 2023-04-18 PROCEDURE — 3074F SYST BP LT 130 MM HG: CPT | Mod: CPTII,,, | Performed by: NURSE PRACTITIONER

## 2023-04-18 RX ORDER — LOSARTAN POTASSIUM 25 MG/1
25 TABLET ORAL DAILY
COMMUNITY

## 2023-04-18 RX ORDER — ROSUVASTATIN CALCIUM 40 MG/1
40 TABLET, COATED ORAL NIGHTLY
Qty: 90 TABLET | Refills: 3 | Status: SHIPPED | OUTPATIENT
Start: 2023-04-18 | End: 2023-08-17 | Stop reason: SDUPTHER

## 2023-04-18 NOTE — PROGRESS NOTES
Patient ID: [id number]     Chief Complaint: wellness      HPI:     Maggi Hatfield is a 52 y.o. female here today for a wellness visit.  Labs were completed prior to her visit and were reviewed with her today.  She continues to complain of fatigue.  She does have a history of anemia.  She completed physical therapy for chronic low back pain which did not improve her symptoms and she is now having left leg numbness.  She did have 1 episode of incontinence of stool last week.  She denies any saddle anesthesia.  She notes intermittent diarrhea and constipation and requesting referral to a gastroenterologist.  She has not changed her diet and she is compliant with all of her medications, including cholesterol medication.  She did establish care with her new cardiologist and there were some minor changes made to her medications.  She finally received her CPAP and sleeping with it.  States that she finally has the right mask. She did not tolerate Ozempic.     Past Medical History:  has a past medical history of Acid reflux, Allergy, VIVIAN positive, Arthritis, Asthma, Chronic pain, Coronary artery disease, CVA (cerebral vascular accident), Diabetes mellitus, type 2, Disorder of optic nerve, Elevated C-reactive protein (CRP), Elevated sed rate, External hemorrhoid, Fatigue, Heart disease, Heart murmur, Hot flashes, Hyperlipidemia, Hypertension, Joint pain, Joint swelling, Neuropathy, PVC (premature ventricular contraction), Rectal bleeding, Rectal polyp, Restless legs, Sinus tachycardia, Sleep apnea, Sleep apnea, unspecified, Thrombosed hemorrhoids, Venous insufficiency, and Vitamin deficiency.    Surgical History:  has a past surgical history that includes Hysterectomy; Cholecystectomy; Hand surgery (01/01/2022); Carpal tunnel release (01/01/2022); Colonoscopy w/ biopsies and polypectomy (11/19/2020); and Esophagogastroduodenoscopy (05/01/2018).    Family History: family history includes COPD in her father; Colon cancer in  "her mother; Heart disease in her father and maternal grandfather; Osteoarthritis in her father; Rheum arthritis in her mother.    Social History:  reports that she has never smoked. She has never used smokeless tobacco. She reports that she does not currently use alcohol. She reports that she does not use drugs.    Current Outpatient Medications   Medication Instructions    albuterol (PROVENTIL) 2.5 mg /3 mL (0.083 %) nebulizer solution USE 1 AMPULE IN NEBULIZER EVERY 4 HOURS AS NEEDED FOR WHEEZING OR SHORTNESS OF BREATH    albuterol (PROVENTIL/VENTOLIN HFA) 90 mcg/actuation inhaler INHALE 2 PUFFS EVERY 4-6 HOURS AS NEEDED FOR SHORTNESS OF BREATH *SHAKE WELL*    aspirin (ECOTRIN) 81 mg, Oral, Daily    BD ULTRA-FINE ALANNA PEN NEEDLE 32 gauge x 5/32" Ndle No dose, route, or frequency recorded.    blood sugar diagnostic (ACCUTREND GLUCOSE TEST STRIPS MISC)   Glucose Test Strips, See Instructions, use as directed with Blood glucose meter  5. HICN: [id number] 6. Dx: E11.9 6a. NIDDM 9. Test frequency: once daily, # 100 EA, 11 Refill(s), Pharmacy: [facility], 165, cm, Height/Length Dosing...    blood-glucose meter kit   glucometer., See Instructions, Use as directed to check glucose daily, # 1 EA, 0 Refill(s), Pharmacy: [facility], 165, cm, Height/Length Dosing, 12/29/20 11:38:00 CST, 134.5, kg, Weight Dosing, 12/29/20 11:38:00 CST    cetirizine (ZYRTEC) 10 mg, Oral, Daily    cholecalciferol (vitamin D3) 5,000 Units, Oral, Daily    dicyclomine (BENTYL) 10 mg, Oral    docosahexaenoic acid/epa (FISH OIL ORAL) 1,200 mg, Oral    DULoxetine (CYMBALTA) 60 mg, Oral, Daily    fluticasone propionate (FLOVENT HFA) 110 mcg/actuation inhaler 1 puff, Inhalation, Every 12 hours    folic acid (FOLVITE) 1,000 mcg, Oral, Daily    gabapentin (NEURONTIN) 400 mg, Oral, Nightly    HYDROcodone-acetaminophen (NORCO) 5-325 mg per tablet 1 tablet, Oral, 3 times daily PRN    isosorbide mononitrate (IMDUR) 60 mg, " "Oral, Daily    losartan (COZAAR) 25 mg, Oral, Daily    metFORMIN (GLUCOPHAGE) 1,000 mg, Oral, 2 times daily    metoprolol succinate (TOPROL-XL) 50 mg, Oral, Daily    montelukast (SINGULAIR) 10 mg tablet TAKE ONE TABLET BY MOUTH ONCE A DAY *GENERIC SINGULAIR*    nitroGLYCERIN (NITROSTAT) 0.4 MG SL tablet DISSOLVE 1 TABLET UNDER TONGUE EVERY 5 MINUTES UP TO 3 DOSES IF NEEDED FOR CHEST PAIN-IF NO RELIEF REPORT TO ER--    pantoprazole (PROTONIX) 40 mg, Oral, Daily    ranolazine (RANEXA) 500 mg, Oral, 2 times daily    rosuvastatin (CRESTOR) 40 mg, Oral, Nightly    tiZANidine (ZANAFLEX) 4 MG tablet TAKE ONE TABLET BY MOUTH THREE TIMES A DAY FOR MUSCLE SPASMS *MAY CAUSE DROWSINESS*    triamcinolone acetonide 0.1% (KENALOG) 0.1 % ointment 1 application, Topical, Daily PRN    TRUE METRIX GLUCOSE TEST STRIP Strp USE AS DIRECTED WITH BLOOD GLUCOSE METER ONCE A DAY    verapamiL (VERELAN) 360 mg, Oral, Daily       Patient is allergic to cephalexin, diazepam, and penicillins.     Patient Care Team:  DARLYN Melgoza as PCP - General (Family Medicine)  Marco Kovacs MD as Consulting Physician (Cardiovascular Disease)  Toro Dewey MD as Consulting Physician (Gastroenterology)       Subjective:     Review of Systems    See HPI     Objective:     Visit Vitals  /70 (BP Location: Left arm, Patient Position: Sitting, BP Method: X-Large (Manual))   Pulse 61   Temp 97.7 °F (36.5 °C) (Oral)   Ht 5' 4.96" (1.65 m)   Wt 132.9 kg (292 lb 15.9 oz)   LMP  (LMP Unknown)   SpO2 96%   BMI 48.81 kg/m²       Physical Exam  Constitutional:       General: She is not in acute distress.     Appearance: She is obese. She is not ill-appearing.   HENT:      Head: Normocephalic and atraumatic.      Nose: Nose normal.      Mouth/Throat:      Mouth: Mucous membranes are moist.      Pharynx: Oropharynx is clear.   Cardiovascular:      Rate and Rhythm: Normal rate and regular rhythm.      Heart sounds: Normal heart sounds.   Pulmonary:      " Effort: Pulmonary effort is normal.      Breath sounds: Normal breath sounds.   Musculoskeletal:      Cervical back: Normal range of motion and neck supple.      Right lower leg: No edema.      Left lower leg: No edema.   Skin:     General: Skin is warm and dry.   Neurological:      Mental Status: She is alert.      Sensory: Sensory deficit present.      Motor: No weakness.      Gait: Gait normal.      Deep Tendon Reflexes: Reflexes abnormal.   Psychiatric:         Mood and Affect: Mood normal.         Behavior: Behavior normal.         Thought Content: Thought content normal.         Judgment: Judgment normal.     Labs Reviewed:     Chemistry:  Lab Results   Component Value Date     04/04/2023    K 4.8 04/04/2023    CHLORIDE 106 04/04/2023    BUN 14.0 04/04/2023    CREATININE 1.00 04/04/2023    EGFRNORACEVR 68 04/04/2023    GLUCOSE 131 (H) 04/04/2023    CALCIUM 9.2 04/04/2023    ALKPHOS 72 04/04/2023    LABPROT 6.7 04/04/2023    ALBUMIN 3.8 04/04/2023    BILIDIR 0.2 09/15/2020    IBILI 0.6 09/15/2020    AST 42 (H) 04/04/2023    ALT 43 04/04/2023    ACYDAJZS98TP 45.00 03/04/2021    TSH 3.240 04/04/2023    JZZMKP7LKKS 1.22 04/04/2023        Lab Results   Component Value Date    HGBA1C 6.0 04/04/2023        Hematology:  Lab Results   Component Value Date    WBC 6.1 04/04/2023    RBC 4.17 04/04/2023    HGB 10.9 (L) 04/04/2023    HCT 34.6 (L) 04/04/2023    MCV 83.0 04/04/2023    MCH 26.1 (L) 04/04/2023    MCHC 31.5 04/04/2023    RDW 14.0 04/04/2023     04/04/2023    MPV 11.7 04/04/2023       Lipid Panel:  Lab Results   Component Value Date    CHOL 171 04/04/2023    HDL 41 04/04/2023    DLDL 110.5 (H) 04/04/2023    TRIG 174 04/04/2023            Assessment:       ICD-10-CM ICD-9-CM   1. Encounter for routine adult health examination with abnormal findings  Z00.01 V70.0   2. Type 2 diabetes mellitus with diabetic neuropathy, without long-term current use of insulin  E11.40 250.60     357.2   3. Anemia,  unspecified type  D64.9 285.9   4. Hyperlipidemia, unspecified hyperlipidemia type  E78.5 272.4   5. Elevated liver enzymes  R74.8 790.5   6. Chronic low back pain, unspecified back pain laterality, unspecified whether sciatica present  M54.50 724.2    G89.29 338.29   7. Numbness and tingling of left leg  R20.0 782.0    R20.2    8. Intermittent diarrhea  R19.7 787.91        Plan:     Iron studies today  MRI low back  Referral to GI - IBS?  Change to atorvastatin to rosuvastatin  Follow up with labs prior in 3 [facility]    Follow up in about 3 months (around 7/18/2023) for Follow Up, Labs Prior, Wellness in 1 year. In addition to their scheduled follow up, the patient has also been instructed to follow up on as needed basis.     Future Appointments   Date Time Provider Department Center   7/3/2023  9:40 AM LAB, [facility] [place] LABORATORY DRAW STATION JOSEP MICHELL Arguelles   7/11/2023 10:30 AM DARLYN Melgoza [facility]        DARLYN Pablo

## 2023-04-19 ENCOUNTER — PATIENT MESSAGE (OUTPATIENT)
Dept: FAMILY MEDICINE | Facility: CLINIC | Age: 53
End: 2023-04-19
Payer: MEDICAID

## 2023-04-19 LAB
IRON SATN MFR SERPL: 15 % (ref 20–50)
IRON SERPL-MCNC: 46 UG/DL (ref 50–170)
TIBC SERPL-MCNC: 259 UG/DL (ref 70–310)
TIBC SERPL-MCNC: 305 UG/DL (ref 250–450)
TRANSFERRIN SERPL-MCNC: 268 MG/DL (ref 180–382)

## 2023-04-20 ENCOUNTER — PATIENT MESSAGE (OUTPATIENT)
Dept: FAMILY MEDICINE | Facility: CLINIC | Age: 53
End: 2023-04-20
Payer: MEDICAID

## 2023-04-26 ENCOUNTER — TELEPHONE (OUTPATIENT)
Dept: FAMILY MEDICINE | Facility: CLINIC | Age: 53
End: 2023-04-26
Payer: MEDICAID

## 2023-04-26 DIAGNOSIS — M62.838 NIGHT MUSCLE SPASMS: ICD-10-CM

## 2023-04-26 RX ORDER — TIZANIDINE 4 MG/1
TABLET ORAL
Qty: 90 TABLET | Refills: 0 | Status: SHIPPED | OUTPATIENT
Start: 2023-04-26 | End: 2023-05-31 | Stop reason: SDUPTHER

## 2023-05-16 ENCOUNTER — TELEPHONE (OUTPATIENT)
Dept: FAMILY MEDICINE | Facility: CLINIC | Age: 53
End: 2023-05-16
Payer: MEDICAID

## 2023-05-16 ENCOUNTER — PATIENT MESSAGE (OUTPATIENT)
Dept: FAMILY MEDICINE | Facility: CLINIC | Age: 53
End: 2023-05-16
Payer: MEDICAID

## 2023-05-16 DIAGNOSIS — E11.40 TYPE 2 DIABETES MELLITUS WITH DIABETIC NEUROPATHY, WITHOUT LONG-TERM CURRENT USE OF INSULIN: Primary | ICD-10-CM

## 2023-05-16 RX ORDER — METFORMIN HYDROCHLORIDE 500 MG/1
1000 TABLET ORAL 2 TIMES DAILY
Qty: 240 TABLET | Refills: 0 | Status: SHIPPED | OUTPATIENT
Start: 2023-05-16 | End: 2023-08-09 | Stop reason: SDUPTHER

## 2023-05-16 NOTE — TELEPHONE ENCOUNTER
----- Message from Gwen Jj sent at 5/16/2023  2:11 PM CDT -----  Regarding: refill  metFORMIN 500 MG tablet     1,000 mg by mouth 2 (two) times daily. - [facility] pharmacy        [phone]

## 2023-05-17 ENCOUNTER — TELEPHONE (OUTPATIENT)
Dept: FAMILY MEDICINE | Facility: CLINIC | Age: 53
End: 2023-05-17
Payer: MEDICAID

## 2023-05-17 DIAGNOSIS — K21.9 GASTROESOPHAGEAL REFLUX DISEASE, UNSPECIFIED WHETHER ESOPHAGITIS PRESENT: ICD-10-CM

## 2023-05-17 DIAGNOSIS — K58.9 IRRITABLE BOWEL SYNDROME, UNSPECIFIED TYPE: Primary | ICD-10-CM

## 2023-05-17 DIAGNOSIS — E56.9 VITAMIN DEFICIENCY: ICD-10-CM

## 2023-05-17 RX ORDER — FOLIC ACID 1 MG/1
1000 TABLET ORAL DAILY
Qty: 30 TABLET | Refills: 0 | Status: SHIPPED | OUTPATIENT
Start: 2023-05-17 | End: 2023-06-19 | Stop reason: SDUPTHER

## 2023-05-17 RX ORDER — PANTOPRAZOLE SODIUM 40 MG/1
40 TABLET, DELAYED RELEASE ORAL DAILY
Qty: 30 TABLET | Refills: 2 | Status: SHIPPED | OUTPATIENT
Start: 2023-05-17 | End: 2023-08-09 | Stop reason: SDUPTHER

## 2023-05-17 NOTE — TELEPHONE ENCOUNTER
----- Message from DARLYN Melgoza sent at 5/16/2023  4:38 PM CDT -----  MRI ordered.  Ok to put in referral for Dr. Dewey.   ----- Message -----  From: Annalee Rain LPN  Sent: 5/16/2023   2:32 PM CDT  To: DARLYN Melgoza    Please advise  ----- Message -----  From: Tata Moya MA  Sent: 5/16/2023   2:18 PM CDT  To: Jose E Loco A Staff    Pt called about a MRI for her lumbar spine and wants a referral to Dr Dewey  (gastro)  to be re-established with him

## 2023-05-23 ENCOUNTER — TELEPHONE (OUTPATIENT)
Dept: FAMILY MEDICINE | Facility: CLINIC | Age: 53
End: 2023-05-23
Payer: MEDICAID

## 2023-05-23 DIAGNOSIS — T78.40XS ALLERGY, SEQUELA: ICD-10-CM

## 2023-05-23 RX ORDER — CETIRIZINE HYDROCHLORIDE 10 MG/1
10 TABLET ORAL DAILY
Qty: 30 TABLET | Refills: 1 | Status: SHIPPED | OUTPATIENT
Start: 2023-05-23 | End: 2023-06-14 | Stop reason: ALTCHOICE

## 2023-05-23 RX ORDER — MONTELUKAST SODIUM 10 MG/1
TABLET ORAL
Qty: 30 TABLET | Refills: 1 | Status: SHIPPED | OUTPATIENT
Start: 2023-05-23 | End: 2023-08-09 | Stop reason: SDUPTHER

## 2023-05-30 ENCOUNTER — HOSPITAL ENCOUNTER (OUTPATIENT)
Dept: RADIOLOGY | Facility: HOSPITAL | Age: 53
Discharge: HOME | End: 2023-05-30
Attending: NURSE PRACTITIONER
Payer: MEDICAID

## 2023-05-30 DIAGNOSIS — M54.9 DORSALGIA, UNSPECIFIED: ICD-10-CM

## 2023-05-30 PROCEDURE — 72148 MRI LUMBAR SPINE W/O DYE: CPT | Mod: TC

## 2023-05-31 DIAGNOSIS — M62.838 NIGHT MUSCLE SPASMS: ICD-10-CM

## 2023-05-31 RX ORDER — TIZANIDINE 4 MG/1
TABLET ORAL
Qty: 90 TABLET | Refills: 0 | Status: SHIPPED | OUTPATIENT
Start: 2023-05-31 | End: 2023-07-03 | Stop reason: SDUPTHER

## 2023-06-01 DIAGNOSIS — G89.29 CHRONIC LOW BACK PAIN, UNSPECIFIED BACK PAIN LATERALITY, UNSPECIFIED WHETHER SCIATICA PRESENT: Primary | ICD-10-CM

## 2023-06-01 DIAGNOSIS — R20.0 NUMBNESS AND TINGLING OF LEFT LEG: ICD-10-CM

## 2023-06-01 DIAGNOSIS — M48.00 SPINAL STENOSIS, UNSPECIFIED SPINAL REGION: ICD-10-CM

## 2023-06-01 DIAGNOSIS — R20.2 NUMBNESS AND TINGLING OF LEFT LEG: ICD-10-CM

## 2023-06-01 DIAGNOSIS — M54.50 CHRONIC LOW BACK PAIN, UNSPECIFIED BACK PAIN LATERALITY, UNSPECIFIED WHETHER SCIATICA PRESENT: Primary | ICD-10-CM

## 2023-06-02 ENCOUNTER — TELEPHONE (OUTPATIENT)
Dept: FAMILY MEDICINE | Facility: CLINIC | Age: 53
End: 2023-06-02
Payer: MEDICAID

## 2023-06-02 NOTE — TELEPHONE ENCOUNTER
Pt verbalized understanding    ----- Message from DARLYN Melgoza sent at 6/1/2023  5:19 PM CDT -----  Please inform patient of results.    There are some bulging/herniated disc and narrowing of the opening that nerves in the back travel thru.  When the nerves are compressed, this leads to pain, numbness, and/or weakness.  She needs to see neurosurgery for further evaluation.  Order in.

## 2023-06-14 ENCOUNTER — OFFICE VISIT (OUTPATIENT)
Dept: FAMILY MEDICINE | Facility: CLINIC | Age: 53
End: 2023-06-14
Payer: MEDICAID

## 2023-06-14 VITALS
DIASTOLIC BLOOD PRESSURE: 88 MMHG | OXYGEN SATURATION: 98 % | HEIGHT: 65 IN | SYSTOLIC BLOOD PRESSURE: 138 MMHG | HEART RATE: 83 BPM | TEMPERATURE: 98 F | BODY MASS INDEX: 48.82 KG/M2 | WEIGHT: 293 LBS

## 2023-06-14 DIAGNOSIS — R09.82 POST-NASAL DRIP: ICD-10-CM

## 2023-06-14 DIAGNOSIS — H69.93 EUSTACHIAN TUBE DYSFUNCTION, BILATERAL: ICD-10-CM

## 2023-06-14 DIAGNOSIS — J30.9 ALLERGIC RHINITIS, UNSPECIFIED SEASONALITY, UNSPECIFIED TRIGGER: Primary | ICD-10-CM

## 2023-06-14 DIAGNOSIS — H92.03 ACUTE EAR PAIN, BILATERAL: ICD-10-CM

## 2023-06-14 PROCEDURE — 1160F RVW MEDS BY RX/DR IN RCRD: CPT | Mod: CPTII,,, | Performed by: NURSE PRACTITIONER

## 2023-06-14 PROCEDURE — 99213 OFFICE O/P EST LOW 20 MIN: CPT | Mod: ,,, | Performed by: NURSE PRACTITIONER

## 2023-06-14 PROCEDURE — 99213 PR OFFICE/OUTPT VISIT, EST, LEVL III, 20-29 MIN: ICD-10-PCS | Mod: ,,, | Performed by: NURSE PRACTITIONER

## 2023-06-14 PROCEDURE — 4010F PR ACE/ARB THEARPY RXD/TAKEN: ICD-10-PCS | Mod: CPTII,,, | Performed by: NURSE PRACTITIONER

## 2023-06-14 PROCEDURE — 3079F DIAST BP 80-89 MM HG: CPT | Mod: CPTII,,, | Performed by: NURSE PRACTITIONER

## 2023-06-14 PROCEDURE — 3079F PR MOST RECENT DIASTOLIC BLOOD PRESSURE 80-89 MM HG: ICD-10-PCS | Mod: CPTII,,, | Performed by: NURSE PRACTITIONER

## 2023-06-14 PROCEDURE — 3075F PR MOST RECENT SYSTOLIC BLOOD PRESS GE 130-139MM HG: ICD-10-PCS | Mod: CPTII,,, | Performed by: NURSE PRACTITIONER

## 2023-06-14 PROCEDURE — 3075F SYST BP GE 130 - 139MM HG: CPT | Mod: CPTII,,, | Performed by: NURSE PRACTITIONER

## 2023-06-14 PROCEDURE — 4010F ACE/ARB THERAPY RXD/TAKEN: CPT | Mod: CPTII,,, | Performed by: NURSE PRACTITIONER

## 2023-06-14 PROCEDURE — 3008F PR BODY MASS INDEX (BMI) DOCUMENTED: ICD-10-PCS | Mod: CPTII,,, | Performed by: NURSE PRACTITIONER

## 2023-06-14 PROCEDURE — 1160F PR REVIEW ALL MEDS BY PRESCRIBER/CLIN PHARMACIST DOCUMENTED: ICD-10-PCS | Mod: CPTII,,, | Performed by: NURSE PRACTITIONER

## 2023-06-14 PROCEDURE — 3008F BODY MASS INDEX DOCD: CPT | Mod: CPTII,,, | Performed by: NURSE PRACTITIONER

## 2023-06-14 PROCEDURE — 1159F MED LIST DOCD IN RCRD: CPT | Mod: CPTII,,, | Performed by: NURSE PRACTITIONER

## 2023-06-14 PROCEDURE — 1159F PR MEDICATION LIST DOCUMENTED IN MEDICAL RECORD: ICD-10-PCS | Mod: CPTII,,, | Performed by: NURSE PRACTITIONER

## 2023-06-14 RX ORDER — LEVOCETIRIZINE DIHYDROCHLORIDE 5 MG/1
5 TABLET, FILM COATED ORAL NIGHTLY
Qty: 30 TABLET | Refills: 11 | Status: SHIPPED | OUTPATIENT
Start: 2023-06-14 | End: 2024-06-13

## 2023-06-14 RX ORDER — MULTIVIT-MIN/FOLIC ACID/LUTEIN 400-250MCG
1 TABLET,CHEWABLE ORAL DAILY
COMMUNITY
Start: 2023-05-01 | End: 2023-08-09 | Stop reason: SDUPTHER

## 2023-06-14 RX ORDER — FLUTICASONE PROPIONATE 50 MCG
1 SPRAY, SUSPENSION (ML) NASAL DAILY
Qty: 16 G | Refills: 2 | Status: SHIPPED | OUTPATIENT
Start: 2023-06-14 | End: 2023-08-09 | Stop reason: SDUPTHER

## 2023-06-14 NOTE — PROGRESS NOTES
"   Patient ID: [id number]     Chief Complaint: Otalgia      HPI:     Maggi Hatfield is a 52 y.o. female here today for c/o bilateral ear pain.  She's Been having pressure in both ears for several weeks now but yesterday she began having pain.  Last night she felt a drainage in the back of her throat.  She denies any problems with her teeth.  No abscesses.  No pain with chewing.  There is no drainage from her ears.  She does have a runny nose.  She does take allergy medication daily Singulair and Zyrtec.  She reports being prescribed Flonase but does not take it on a regular basis.  She is established with an ENT.      Past Medical History:  has a past medical history of Acid reflux, Allergy, VIVIAN positive, Arthritis, Asthma, Chronic pain, Coronary artery disease, CVA (cerebral vascular accident), Diabetes mellitus, type 2, Disorder of optic nerve, Elevated C-reactive protein (CRP), Elevated sed rate, External hemorrhoid, Fatigue, Heart disease, Heart murmur, Hot flashes, Hyperlipidemia, Hypertension, Joint pain, Joint swelling, Neuropathy, PVC (premature ventricular contraction), Rectal bleeding, Rectal polyp, Restless legs, Sinus tachycardia, Sleep apnea, Sleep apnea, unspecified, Thrombosed hemorrhoids, Venous insufficiency, and Vitamin deficiency.    Social History:  reports that she has never smoked. She has never used smokeless tobacco. She reports that she does not currently use alcohol. She reports that she does not use drugs.    Current Outpatient Medications   Medication Instructions    albuterol (PROVENTIL) 2.5 mg /3 mL (0.083 %) nebulizer solution USE 1 AMPULE IN NEBULIZER EVERY 4 HOURS AS NEEDED FOR WHEEZING OR SHORTNESS OF BREATH    albuterol (PROVENTIL/VENTOLIN HFA) 90 mcg/actuation inhaler INHALE 2 PUFFS EVERY 4-6 HOURS AS NEEDED FOR SHORTNESS OF BREATH *SHAKE WELL*    aspirin (ECOTRIN) 81 mg, Oral, Daily    BD ULTRA-FINE ALANNA PEN NEEDLE 32 gauge x 5/32" Ndle No dose, route, or frequency recorded.    " blood sugar diagnostic (ACCUTREND GLUCOSE TEST STRIPS MISC)   Glucose Test Strips, See Instructions, use as directed with Blood glucose meter  [id number]. Dx: E11.9 6a. NIDDM 9. Test frequency: once daily, # 100 EA, 11 Refill(s), Pharmacy: [facility], 165, cm, Height/Length Dosing...    blood-glucose meter kit   glucometer., See Instructions, Use as directed to check glucose daily, # 1 EA, 0 Refill(s), Pharmacy: [facility], 165, cm, Height/Length Dosing, 12/29/20 11:38:00 CST, 134.5, kg, Weight Dosing, 12/29/20 11:38:00 CST    CENTRUM SILVER 400-250 mcg Chew 1 tablet, Oral, Daily    cholecalciferol (vitamin D3) 5,000 Units, Oral, Daily    docosahexaenoic acid/epa (FISH OIL ORAL) 1,200 mg, Oral    DULoxetine (CYMBALTA) 60 mg, Oral, Daily    fluticasone propionate (FLONASE) 50 mcg, Each Nostril, Daily    fluticasone propionate (FLOVENT HFA) 110 mcg/actuation inhaler 1 puff, Inhalation, Every 12 hours    folic acid (FOLVITE) 1,000 mcg, Oral, Daily    gabapentin (NEURONTIN) 400 mg, Oral, Nightly    isosorbide mononitrate (IMDUR) 60 mg, Oral, Daily    levocetirizine (XYZAL) 5 mg, Oral, Nightly    losartan (COZAAR) 25 mg, Oral, Daily    metFORMIN (GLUCOPHAGE) 1,000 mg, Oral, 2 times daily    metoprolol succinate (TOPROL-XL) 50 mg, Oral, Daily    montelukast (SINGULAIR) 10 mg tablet TAKE ONE TABLET BY MOUTH ONCE A DAY *GENERIC SINGULAIR*    nitroGLYCERIN (NITROSTAT) 0.4 MG SL tablet DISSOLVE 1 TABLET UNDER TONGUE EVERY 5 MINUTES UP TO 3 DOSES IF NEEDED FOR CHEST PAIN-IF NO RELIEF REPORT TO ER--    pantoprazole (PROTONIX) 40 mg, Oral, Daily    ranolazine (RANEXA) 500 mg, Oral, 2 times daily    rosuvastatin (CRESTOR) 40 mg, Oral, Nightly    tiZANidine (ZANAFLEX) 4 MG tablet TAKE ONE TABLET BY MOUTH THREE TIMES A DAY FOR MUSCLE SPASMS *MAY CAUSE DROWSINESS*    triamcinolone acetonide 0.1% (KENALOG) 0.1 % ointment 1 application, Topical, Daily PRN    TRUE METRIX GLUCOSE TEST STRIP  "Strp USE AS DIRECTED WITH BLOOD GLUCOSE METER ONCE A DAY    verapamiL (VERELAN) 360 mg, Oral, Daily       Patient is allergic to cephalexin, diazepam, and penicillins.     Patient Care Team:  DARLYN Melgoza as PCP - General (Family Medicine)  Marco Kvoacs MD as Consulting Physician (Cardiovascular Disease)  Toro Dewey MD as Consulting Physician (Gastroenterology)  Arabella Cevallos MD (Otolaryngology)       Subjective       Review of Systems   HENT:  Positive for ear pain.      See HPI     Objective       Visit Vitals  /88   Pulse 83   Temp 98.1 °F (36.7 °C) (Temporal)   Ht 5' 4.96" (1.65 m)   Wt 134.7 kg (297 lb)   LMP  (LMP Unknown)   SpO2 98%   BMI 49.48 kg/m²       Physical Exam  Vitals reviewed.   Constitutional:       Appearance: Normal appearance.   HENT:      Right Ear: Ear canal and external ear normal. No drainage or swelling. Tympanic membrane is bulging. Tympanic membrane is not perforated. Tympanic membrane has decreased mobility.      Left Ear: Ear canal and external ear normal. No drainage or swelling. Tympanic membrane is bulging. Tympanic membrane is not perforated. Tympanic membrane has decreased mobility.   Cardiovascular:      Rate and Rhythm: Normal rate and regular rhythm.      Heart sounds: Normal heart sounds.   Pulmonary:      Effort: Pulmonary effort is normal.      Breath sounds: Normal breath sounds.   Skin:     General: Skin is warm and dry.   Neurological:      Mental Status: She is alert and oriented to person, place, and time.   Psychiatric:         Mood and Affect: Mood normal.         Speech: Speech normal.         Behavior: Behavior normal. Behavior is cooperative.     Assessment:       ICD-10-CM ICD-9-CM   1. Allergic rhinitis, unspecified seasonality, unspecified trigger  J30.9 477.9   2. Eustachian tube dysfunction, bilateral  H69.83 381.81   3. Acute ear pain, bilateral  H92.03 388.70   4. Post-nasal drip  R09.82 784.91      Plan:   Stop Zyrtec  Start " Xyzal  Restart Flonase  RTC 3-4 weeks for follow up.  If no improvement, suggest scheduling appointment with her ENT.    Follow up if symptoms worsen or fail to improve. In addition to their scheduled follow up, the patient has also been instructed to follow up on as needed basis.     Future Appointments   Date Time Provider Department Center   7/3/2023  9:40 AM LAB, [facility] LABORATORY DRAW STATION JOSEP MICHELL Arguelles   7/11/2023 10:30 AM DARLYN Melgoza [facility] INNA Arguelles

## 2023-06-19 ENCOUNTER — TELEPHONE (OUTPATIENT)
Dept: FAMILY MEDICINE | Facility: CLINIC | Age: 53
End: 2023-06-19
Payer: MEDICAID

## 2023-06-19 DIAGNOSIS — E56.9 VITAMIN DEFICIENCY: ICD-10-CM

## 2023-06-19 RX ORDER — FOLIC ACID 1 MG/1
1000 TABLET ORAL DAILY
Qty: 30 TABLET | Refills: 0 | Status: SHIPPED | OUTPATIENT
Start: 2023-06-19

## 2023-07-03 DIAGNOSIS — M62.838 NIGHT MUSCLE SPASMS: ICD-10-CM

## 2023-07-03 PROCEDURE — 80053 COMPREHEN METABOLIC PANEL: CPT | Performed by: NURSE PRACTITIONER

## 2023-07-03 PROCEDURE — 86803 HEPATITIS C AB TEST: CPT | Performed by: NURSE PRACTITIONER

## 2023-07-03 PROCEDURE — 87389 HIV-1 AG W/HIV-1&-2 AB AG IA: CPT | Performed by: NURSE PRACTITIONER

## 2023-07-03 PROCEDURE — 80061 LIPID PANEL: CPT | Performed by: NURSE PRACTITIONER

## 2023-07-03 RX ORDER — TIZANIDINE 4 MG/1
TABLET ORAL
Qty: 90 TABLET | Refills: 0 | Status: SHIPPED | OUTPATIENT
Start: 2023-07-03 | End: 2023-08-09 | Stop reason: SDUPTHER

## 2023-08-08 ENCOUNTER — TELEPHONE (OUTPATIENT)
Dept: FAMILY MEDICINE | Facility: CLINIC | Age: 53
End: 2023-08-08

## 2023-08-08 NOTE — TELEPHONE ENCOUNTER
Please advise if all her medications can be filled. She said all but 1 medication needs refills. She has 2 no shows and 1 cancel in July.

## 2023-08-08 NOTE — TELEPHONE ENCOUNTER
----- Message from Smiley Burger sent at 8/8/2023 10:20 AM CDT -----  Regarding: refills  Pt is needing all of her meds except the cymbalta to be refilled until she get her medicaid back on track.    [facility] [address]    [phone]

## 2023-08-09 ENCOUNTER — TELEPHONE (OUTPATIENT)
Dept: FAMILY MEDICINE | Facility: CLINIC | Age: 53
End: 2023-08-09

## 2023-08-09 DIAGNOSIS — E55.9 VITAMIN D DEFICIENCY: ICD-10-CM

## 2023-08-09 DIAGNOSIS — T78.40XS ALLERGY, SEQUELA: ICD-10-CM

## 2023-08-09 DIAGNOSIS — H69.93 EUSTACHIAN TUBE DYSFUNCTION, BILATERAL: ICD-10-CM

## 2023-08-09 DIAGNOSIS — M62.838 NIGHT MUSCLE SPASMS: ICD-10-CM

## 2023-08-09 DIAGNOSIS — E11.40 TYPE 2 DIABETES MELLITUS WITH DIABETIC NEUROPATHY, WITHOUT LONG-TERM CURRENT USE OF INSULIN: ICD-10-CM

## 2023-08-09 DIAGNOSIS — G62.9 NEUROPATHY: Primary | ICD-10-CM

## 2023-08-09 DIAGNOSIS — K21.9 GASTROESOPHAGEAL REFLUX DISEASE, UNSPECIFIED WHETHER ESOPHAGITIS PRESENT: ICD-10-CM

## 2023-08-09 DIAGNOSIS — G47.33 OBSTRUCTIVE SLEEP APNEA: ICD-10-CM

## 2023-08-09 DIAGNOSIS — J30.9 ALLERGIC RHINITIS, UNSPECIFIED SEASONALITY, UNSPECIFIED TRIGGER: ICD-10-CM

## 2023-08-09 RX ORDER — MULTIVIT-MIN/FOLIC ACID/LUTEIN 400-250MCG
1 TABLET,CHEWABLE ORAL DAILY
Qty: 30 TABLET | Refills: 1 | Status: SHIPPED | OUTPATIENT
Start: 2023-08-09

## 2023-08-09 RX ORDER — ALBUTEROL SULFATE 90 UG/1
AEROSOL, METERED RESPIRATORY (INHALATION)
Qty: 18 G | Refills: 1 | Status: SHIPPED | OUTPATIENT
Start: 2023-08-09

## 2023-08-09 RX ORDER — PANTOPRAZOLE SODIUM 40 MG/1
40 TABLET, DELAYED RELEASE ORAL DAILY
Qty: 30 TABLET | Refills: 2 | Status: SHIPPED | OUTPATIENT
Start: 2023-08-09

## 2023-08-09 RX ORDER — CHOLECALCIFEROL (VITAMIN D3) 125 MCG
5000 CAPSULE ORAL DAILY
Qty: 30 CAPSULE | Refills: 1 | Status: SHIPPED | OUTPATIENT
Start: 2023-08-09

## 2023-08-09 RX ORDER — MONTELUKAST SODIUM 10 MG/1
TABLET ORAL
Qty: 30 TABLET | Refills: 1 | Status: SHIPPED | OUTPATIENT
Start: 2023-08-09

## 2023-08-09 RX ORDER — METFORMIN HYDROCHLORIDE 500 MG/1
1000 TABLET ORAL 2 TIMES DAILY
Qty: 240 TABLET | Refills: 0 | Status: SHIPPED | OUTPATIENT
Start: 2023-08-09

## 2023-08-09 RX ORDER — TIZANIDINE 4 MG/1
TABLET ORAL
Qty: 90 TABLET | Refills: 0 | Status: SHIPPED | OUTPATIENT
Start: 2023-08-09

## 2023-08-09 RX ORDER — FLUTICASONE PROPIONATE 110 UG/1
1 AEROSOL, METERED RESPIRATORY (INHALATION) EVERY 12 HOURS
Qty: 12 G | Refills: 1 | Status: SHIPPED | OUTPATIENT
Start: 2023-08-09

## 2023-08-09 RX ORDER — CALCIUM CITRATE/VITAMIN D3 200MG-6.25
TABLET ORAL
Qty: 100 EACH | Refills: 1 | Status: SHIPPED | OUTPATIENT
Start: 2023-08-09

## 2023-08-09 RX ORDER — GABAPENTIN 400 MG/1
400 CAPSULE ORAL NIGHTLY
Qty: 30 CAPSULE | Refills: 1 | Status: SHIPPED | OUTPATIENT
Start: 2023-08-09

## 2023-08-09 RX ORDER — FLUTICASONE PROPIONATE 50 MCG
1 SPRAY, SUSPENSION (ML) NASAL DAILY
Qty: 16 G | Refills: 2 | Status: SHIPPED | OUTPATIENT
Start: 2023-08-09

## 2023-08-09 RX ORDER — ASPIRIN 81 MG/1
81 TABLET ORAL DAILY
Qty: 30 TABLET | Refills: 1 | Status: SHIPPED | OUTPATIENT
Start: 2023-08-09

## 2023-08-09 NOTE — TELEPHONE ENCOUNTER
Patient called and appointment will be made and medications have been sent to [facility] on [address].

## 2023-08-17 ENCOUNTER — TELEPHONE (OUTPATIENT)
Dept: FAMILY MEDICINE | Facility: CLINIC | Age: 53
End: 2023-08-17

## 2023-08-17 DIAGNOSIS — E78.5 HYPERLIPIDEMIA, UNSPECIFIED HYPERLIPIDEMIA TYPE: Primary | ICD-10-CM

## 2023-08-17 RX ORDER — ROSUVASTATIN CALCIUM 40 MG/1
40 TABLET, COATED ORAL NIGHTLY
Qty: 90 TABLET | Refills: 3 | Status: SHIPPED | OUTPATIENT
Start: 2023-08-17 | End: 2024-08-16

## 2023-09-22 DIAGNOSIS — E11.40 TYPE 2 DIABETES MELLITUS WITH DIABETIC NEUROPATHY, WITHOUT LONG-TERM CURRENT USE OF INSULIN: Primary | ICD-10-CM

## 2023-10-03 DIAGNOSIS — E11.40 TYPE 2 DIABETES MELLITUS WITH DIABETIC NEUROPATHY, WITHOUT LONG-TERM CURRENT USE OF INSULIN: ICD-10-CM

## 2024-09-10 ENCOUNTER — TELEPHONE (OUTPATIENT)
Dept: FAMILY MEDICINE | Facility: CLINIC | Age: 54
End: 2024-09-10

## 2024-10-09 ENCOUNTER — PATIENT OUTREACH (OUTPATIENT)
Dept: ADMINISTRATIVE | Facility: HOSPITAL | Age: 54
End: 2024-10-09

## 2024-10-09 NOTE — PROGRESS NOTES
Health Maintenance Topic(s) Outreach Outcomes & Actions Taken:    Breast Cancer Screening - Outreach Outcomes & Actions Taken  : Patient overdue, attempted to contact but no answer and no voicemail

## 2024-10-23 DIAGNOSIS — Z12.31 BREAST CANCER SCREENING BY MAMMOGRAM: Primary | ICD-10-CM

## 2024-12-19 DIAGNOSIS — Z00.01 ENCOUNTER FOR ROUTINE ADULT HEALTH EXAMINATION WITH ABNORMAL FINDINGS: Primary | ICD-10-CM

## 2025-03-18 LAB
LEFT EYE DM RETINOPATHY: NEGATIVE
RIGHT EYE DM RETINOPATHY: NEGATIVE

## 2025-04-23 ENCOUNTER — PATIENT OUTREACH (OUTPATIENT)
Facility: CLINIC | Age: 55
End: 2025-04-23

## 2025-04-23 NOTE — PROGRESS NOTES
Health Maintenance Topic(s) Outreach Outcomes & Actions Taken:    Eye Exam - Outreach Outcomes & Actions Taken  : Diabetic Eye External Records Uploaded, Care Team & History Updated if Applicable       Additional Notes:  Diabetic Eye Exam 3/18/25

## 2025-05-23 ENCOUNTER — HOSPITAL ENCOUNTER (OUTPATIENT)
Dept: RADIOLOGY | Facility: HOSPITAL | Age: 55
Discharge: HOME | End: 2025-05-23
Attending: NURSE PRACTITIONER
Payer: MEDICAID

## 2025-05-23 DIAGNOSIS — Z12.31 BREAST CANCER SCREENING BY MAMMOGRAM: ICD-10-CM

## 2025-05-23 PROCEDURE — 77067 SCR MAMMO BI INCL CAD: CPT | Mod: 26,,, | Performed by: STUDENT IN AN ORGANIZED HEALTH CARE EDUCATION/TRAINING PROGRAM

## 2025-05-23 PROCEDURE — 77063 BREAST TOMOSYNTHESIS BI: CPT | Mod: 26,,, | Performed by: STUDENT IN AN ORGANIZED HEALTH CARE EDUCATION/TRAINING PROGRAM

## 2025-05-23 PROCEDURE — 77067 SCR MAMMO BI INCL CAD: CPT | Mod: TC

## 2025-05-29 ENCOUNTER — RESULTS FOLLOW-UP (OUTPATIENT)
Dept: FAMILY MEDICINE | Facility: CLINIC | Age: 55
End: 2025-05-29

## 2025-05-30 ENCOUNTER — TELEPHONE (OUTPATIENT)
Dept: FAMILY MEDICINE | Facility: CLINIC | Age: 55
End: 2025-05-30
Payer: MEDICAID

## 2025-05-30 NOTE — TELEPHONE ENCOUNTER
----- Message from DARLYN Cunningham sent at 5/29/2025  6:41 PM CDT -----  Normal MMG. Repeat in 1 year.   ----- Message -----  From: Gita, Rad Results In  Sent: 5/29/2025  12:24 PM CDT  To: DARLYN Melgoza